# Patient Record
Sex: FEMALE | Race: BLACK OR AFRICAN AMERICAN | NOT HISPANIC OR LATINO | URBAN - METROPOLITAN AREA
[De-identification: names, ages, dates, MRNs, and addresses within clinical notes are randomized per-mention and may not be internally consistent; named-entity substitution may affect disease eponyms.]

---
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
## 2021-05-15 VITALS
HEART RATE: 74 BPM | BODY MASS INDEX: 48.21 KG/M2 | HEIGHT: 62 IN | OXYGEN SATURATION: 100 % | SYSTOLIC BLOOD PRESSURE: 128 MMHG | DIASTOLIC BLOOD PRESSURE: 74 MMHG | TEMPERATURE: 98.2 F | WEIGHT: 262 LBS

## 2023-12-04 ENCOUNTER — INITIAL PRENATAL (OUTPATIENT)
Dept: OBSTETRICS AND GYNECOLOGY | Facility: CLINIC | Age: 23
End: 2023-12-04
Payer: COMMERCIAL

## 2023-12-04 VITALS — WEIGHT: 202 LBS | DIASTOLIC BLOOD PRESSURE: 74 MMHG | SYSTOLIC BLOOD PRESSURE: 115 MMHG | BODY MASS INDEX: 36.95 KG/M2

## 2023-12-04 DIAGNOSIS — O21.9 NAUSEA AND VOMITING DURING PREGNANCY PRIOR TO 22 WEEKS GESTATION: ICD-10-CM

## 2023-12-04 DIAGNOSIS — Z34.90 PREGNANCY WITH UNCERTAIN DATES, ANTEPARTUM: ICD-10-CM

## 2023-12-04 DIAGNOSIS — Z34.90 EARLY STAGE OF PREGNANCY: Primary | ICD-10-CM

## 2023-12-04 PROCEDURE — 0501F PRENATAL FLOW SHEET: CPT | Performed by: OBSTETRICS & GYNECOLOGY

## 2023-12-04 RX ORDER — ONDANSETRON 4 MG/1
4 TABLET, ORALLY DISINTEGRATING ORAL EVERY 8 HOURS PRN
Qty: 20 TABLET | Refills: 0 | Status: SHIPPED | OUTPATIENT
Start: 2023-12-04

## 2023-12-04 RX ORDER — PNV,CALCIUM 72/IRON/FOLIC ACID 27 MG-1 MG
TABLET ORAL
COMMUNITY

## 2023-12-04 NOTE — PROGRESS NOTES
Chief Complaint   Patient presents with    Initial Prenatal Visit     HPI- Pt is 23 y.o.  at 11w2d here for prenatal visit.  Patient presents for initial OB visit.  She states she is sure regarding her LMP.  She denies any major underlying medical problems.  She reports she is currently smoking marijuana, but has cut back and plans to quit with pregnancy.  She does report nausea and vomiting and reports that she occasionally has some bad days, but most days it is very manageable.  She has no other complaints today.    ROS-     - No vaginal bleeding    GI- No abdominal pain    /74   Wt 91.6 kg (202 lb)   LMP 2023   BMI 36.95 kg/m²   Exam - See flow sheet    Fetal heart rate is normal    Assessment-  Diagnoses and all orders for this visit:    Early stage of pregnancy  -     OB Panel With HIV  -     Urine Culture - Urine, Urine, Clean Catch  -     Drug Profile Urine - 9 Drugs - Urine, Clean Catch  -     IGP,CtNgTv,rfx Aptima HPV ASCU    Pregnancy with uncertain dates, antepartum  -     US Ob Transvaginal; Future    Nausea and vomiting during pregnancy prior to 22 weeks gestation  -     ondansetron ODT (ZOFRAN-ODT) 4 MG disintegrating tablet; Place 1 tablet on the tongue Every 8 (Eight) Hours As Needed for Nausea or Vomiting.    Other orders  -     Prenatal Vit-Fe Fumarate-FA (WesTab Plus) 27-1 MG tablet; take 1 tablet by mouth 1 time each day    Initial OB counseling was done.  Discussed delivering hospital and call system.  She states she will be moving back to [facility] and may transfer care to [facility].  I did discuss with her timing of prenatal visits.  I discussed recommendations to stop marijuana in pregnancy and risks associated with marijuana use in pregnancy.  OB labs and ultrasound were ordered and she will follow-up in 4 weeks.

## 2023-12-05 LAB
ABO GROUP BLD: NORMAL
BASOPHILS # BLD AUTO: 0 X10E3/UL (ref 0–0.2)
BASOPHILS NFR BLD AUTO: 1 %
BLD GP AB SCN SERPL QL: NEGATIVE
EOSINOPHIL # BLD AUTO: 0.1 X10E3/UL (ref 0–0.4)
EOSINOPHIL NFR BLD AUTO: 2 %
ERYTHROCYTE [DISTWIDTH] IN BLOOD BY AUTOMATED COUNT: 12.6 % (ref 11.7–15.4)
HBV SURFACE AG SERPL QL IA: NEGATIVE
HCT VFR BLD AUTO: 36 % (ref 34–46.6)
HCV IGG SERPL QL IA: NON REACTIVE
HGB BLD-MCNC: 12.1 G/DL (ref 11.1–15.9)
HIV 1+2 AB+HIV1 P24 AG SERPL QL IA: NON REACTIVE
IMM GRANULOCYTES # BLD AUTO: 0 X10E3/UL (ref 0–0.1)
IMM GRANULOCYTES NFR BLD AUTO: 0 %
LYMPHOCYTES # BLD AUTO: 1.4 X10E3/UL (ref 0.7–3.1)
LYMPHOCYTES NFR BLD AUTO: 24 %
MCH RBC QN AUTO: 28.7 PG (ref 26.6–33)
MCHC RBC AUTO-ENTMCNC: 33.6 G/DL (ref 31.5–35.7)
MCV RBC AUTO: 86 FL (ref 79–97)
MONOCYTES # BLD AUTO: 0.5 X10E3/UL (ref 0.1–0.9)
MONOCYTES NFR BLD AUTO: 8 %
NEUTROPHILS # BLD AUTO: 3.9 X10E3/UL (ref 1.4–7)
NEUTROPHILS NFR BLD AUTO: 65 %
PLATELET # BLD AUTO: 274 X10E3/UL (ref 150–450)
RBC # BLD AUTO: 4.21 X10E6/UL (ref 3.77–5.28)
RH BLD: POSITIVE
RPR SER QL: NON REACTIVE
RUBV IGG SERPL IA-ACNC: 3.72 INDEX
WBC # BLD AUTO: 5.9 X10E3/UL (ref 3.4–10.8)

## 2023-12-06 LAB
BACTERIA UR CULT: NORMAL
BACTERIA UR CULT: NORMAL

## 2023-12-07 ENCOUNTER — TELEPHONE (OUTPATIENT)
Dept: OBSTETRICS AND GYNECOLOGY | Facility: CLINIC | Age: 23
End: 2023-12-07
Payer: COMMERCIAL

## 2023-12-07 PROBLEM — O98.819 CHLAMYDIA INFECTION DURING PREGNANCY: Status: ACTIVE | Noted: 2023-12-07

## 2023-12-07 PROBLEM — A59.01 TRICHOMONAL VAGINITIS: Status: ACTIVE | Noted: 2023-12-07

## 2023-12-07 PROBLEM — A74.9 CHLAMYDIA INFECTION DURING PREGNANCY: Status: ACTIVE | Noted: 2023-12-07

## 2023-12-07 LAB
C TRACH RRNA CVX QL NAA+PROBE: POSITIVE
CONV .: ABNORMAL
CYTOLOGIST CVX/VAG CYTO: ABNORMAL
CYTOLOGY CVX/VAG DOC CYTO: ABNORMAL
CYTOLOGY CVX/VAG DOC THIN PREP: ABNORMAL
DX ICD CODE: ABNORMAL
HIV 1 & 2 AB SER-IMP: ABNORMAL
N GONORRHOEA RRNA CVX QL NAA+PROBE: NEGATIVE
OTHER STN SPEC: ABNORMAL
STAT OF ADQ CVX/VAG CYTO-IMP: ABNORMAL
T VAGINALIS RRNA SPEC QL NAA+PROBE: POSITIVE

## 2023-12-07 RX ORDER — METRONIDAZOLE 500 MG/1
500 TABLET ORAL 2 TIMES DAILY
Qty: 14 TABLET | Refills: 0 | Status: SHIPPED | OUTPATIENT
Start: 2023-12-07 | End: 2023-12-14

## 2023-12-07 RX ORDER — AZITHROMYCIN 500 MG/1
1000 TABLET, FILM COATED ORAL ONCE
Qty: 2 TABLET | Refills: 0 | Status: SHIPPED | OUTPATIENT
Start: 2023-12-07 | End: 2023-12-07

## 2023-12-07 NOTE — TELEPHONE ENCOUNTER
----- Message from Carline Smiley MD sent at 12/7/2023  1:13 PM EST -----  Please let patient know she is positive for chlamydia and trichomonas.  Both are STDs that her partner needs to be treated for and they should refrain from intercourse for at least one week after each have completed treatment.  I have sent two antibiotics to her pharmacy for treatment.

## 2023-12-09 LAB
AMPHETAMINES UR QL SCN: NEGATIVE NG/ML
BARBITURATES UR QL SCN: NEGATIVE NG/ML
BENZODIAZ UR QL: NEGATIVE NG/ML
BZE UR QL: NEGATIVE NG/ML
CARBOXYTHC UR QL CFM: POSITIVE
METHADONE UR QL SCN: NEGATIVE NG/ML
OPIATES UR QL: NEGATIVE NG/ML
PCP UR QL SCN: NEGATIVE NG/ML
PROPOXYPH UR QL SCN: NEGATIVE NG/ML

## 2024-01-08 ENCOUNTER — ROUTINE PRENATAL (OUTPATIENT)
Dept: OBSTETRICS AND GYNECOLOGY | Facility: CLINIC | Age: 24
End: 2024-01-08
Payer: MEDICAID

## 2024-01-08 VITALS — BODY MASS INDEX: 36.95 KG/M2 | SYSTOLIC BLOOD PRESSURE: 121 MMHG | WEIGHT: 202 LBS | DIASTOLIC BLOOD PRESSURE: 71 MMHG

## 2024-01-08 DIAGNOSIS — A74.9 CHLAMYDIA INFECTION DURING PREGNANCY: ICD-10-CM

## 2024-01-08 DIAGNOSIS — Z36.0 ENCOUNTER FOR ANTENATAL SCREENING FOR CHROMOSOMAL ANOMALIES: ICD-10-CM

## 2024-01-08 DIAGNOSIS — Z36.89 ENCOUNTER FOR FETAL ANATOMIC SURVEY: ICD-10-CM

## 2024-01-08 DIAGNOSIS — O98.819 CHLAMYDIA INFECTION DURING PREGNANCY: ICD-10-CM

## 2024-01-08 DIAGNOSIS — Z36.1 ENCOUNTER FOR ANTENATAL SCREENING FOR HIGH ALPHA-FETOPROTEIN LEVEL: ICD-10-CM

## 2024-01-08 DIAGNOSIS — Z3A.16 16 WEEKS GESTATION OF PREGNANCY: Primary | ICD-10-CM

## 2024-01-08 DIAGNOSIS — A59.01 TRICHOMONAL VAGINITIS: ICD-10-CM

## 2024-01-08 DIAGNOSIS — Z36.86 ENCOUNTER FOR ANTENATAL SCREENING FOR CERVICAL LENGTH: ICD-10-CM

## 2024-01-08 PROCEDURE — 99214 OFFICE O/P EST MOD 30 MIN: CPT | Performed by: OBSTETRICS & GYNECOLOGY

## 2024-01-08 NOTE — PROGRESS NOTES
Chief Complaint   Patient presents with    Routine Prenatal Visit     HPI- Pt is 23 y.o.  at 16w2d here for prenatal visit.  Patient has no complaints today and has been feeling well.  She did complete antibiotics for STDs and denies intercourse since then.    ROS-     - No vaginal bleeding    GI- No abdominal pain    /71   Wt 91.6 kg (202 lb)   LMP 2023   BMI 36.95 kg/m²   Exam - See flow sheet    Fetal heart rate is normal    Assessment-  Diagnoses and all orders for this visit:    16 weeks gestation of pregnancy    Encounter for  screening for high alpha-fetoprotein level  -     Alpha Fetoprotein, Maternal    Encounter for  screening for chromosomal anomalies  -     [id number] PLUS Core (chr21,18,13,sex) - Blood,    Chlamydia infection during pregnancy  -     Chlamydia trachomatis, Neisseria gonorrhoeae, Trichomonas vaginalis, PCR - Swab, Vagina    Trichomonal vaginitis  -     Chlamydia trachomatis, Neisseria gonorrhoeae, Trichomonas vaginalis, PCR - Swab, Vagina    Encounter for fetal anatomic survey  -     US Ob 14 + Weeks Single or First Gestation; Future    Encounter for  screening for cervical length  -     US Ob Transvaginal; Future    Test of cure was obtained today.  She was offered cell free DNA testing and screening for spina bifida and desires.  Discussed anatomy ultrasound with her next visit in 4 weeks.

## 2024-01-10 LAB
AFP INTERP SERPL-IMP: NORMAL
AFP INTERP SERPL-IMP: NORMAL
AFP MOM SERPL: 1.47
AFP SERPL-MCNC: 47.2 NG/ML
AGE AT DELIVERY: 23.6 YR
C TRACH RRNA SPEC QL NAA+PROBE: NEGATIVE
GA METHOD: NORMAL
GA: 16.3 WEEKS
IDDM PATIENT QL: NO
LABORATORY COMMENT REPORT: NORMAL
MULTIPLE PREGNANCY: NO
N GONORRHOEA RRNA SPEC QL NAA+PROBE: NEGATIVE
NEURAL TUBE DEFECT RISK FETUS: 5931 %
RESULT: NORMAL
T VAGINALIS RRNA SPEC QL NAA+PROBE: NEGATIVE

## 2024-01-15 LAB
CFDNA.FET/CFDNA.TOTAL SFR FETUS: NORMAL %
CITATION REF LAB TEST: NORMAL
FET 13+18+21+X+Y ANEUP PLAS.CFDNA: NEGATIVE
FET CHR 21 TS PLAS.CFDNA QL: NEGATIVE
FET SEX PLAS.CFDNA DOSAGE CFDNA: NORMAL
FET TS 13 RISK PLAS.CFDNA QL: NEGATIVE
FET TS 18 RISK WBC.DNA+CFDNA QL: NEGATIVE
GA EST FROM CONCEPTION DATE: NORMAL D
GESTATIONAL AGE > 9:: YES
LAB DIRECTOR NAME PROVIDER: NORMAL
LAB DIRECTOR NAME PROVIDER: NORMAL
LABORATORY COMMENT REPORT: NORMAL
LIMITATIONS OF THE TEST: NORMAL
NEGATIVE PREDICTIVE VALUE: NORMAL
NOTE: NORMAL
PERFORMANCE CHARACTERISTICS: NORMAL
POSITIVE PREDICTIVE VALUE: NORMAL
REF LAB TEST METHOD: NORMAL
TEST PERFORMANCE INFO SPEC: NORMAL

## 2024-01-16 ENCOUNTER — TELEPHONE (OUTPATIENT)
Dept: OBSTETRICS AND GYNECOLOGY | Facility: CLINIC | Age: 24
End: 2024-01-16
Payer: MEDICAID

## 2024-01-16 NOTE — TELEPHONE ENCOUNTER
----- Message from Carline Smiley MD sent at 1/16/2024  8:15 AM EST -----  Please let patient know that her genetic testing was normal, and if she wants to know gender, it showed a female fetus

## 2024-01-30 ENCOUNTER — INITIAL PRENATAL (OUTPATIENT)
Dept: OBSTETRICS AND GYNECOLOGY | Facility: CLINIC | Age: 24
End: 2024-01-30
Payer: MEDICAID

## 2024-01-30 ENCOUNTER — PATIENT ROUNDING (BHMG ONLY) (OUTPATIENT)
Dept: OBSTETRICS AND GYNECOLOGY | Facility: CLINIC | Age: 24
End: 2024-01-30
Payer: MEDICAID

## 2024-01-30 VITALS — WEIGHT: 201 LBS | BODY MASS INDEX: 36.76 KG/M2 | DIASTOLIC BLOOD PRESSURE: 74 MMHG | SYSTOLIC BLOOD PRESSURE: 113 MMHG

## 2024-01-30 DIAGNOSIS — Z34.00 SUPERVISION OF NORMAL FIRST PREGNANCY, ANTEPARTUM: Primary | ICD-10-CM

## 2024-01-30 LAB
GLUCOSE UR STRIP-MCNC: NEGATIVE MG/DL
PROT UR STRIP-MCNC: NEGATIVE MG/DL

## 2024-01-30 NOTE — PROGRESS NOTES
OB FOLLOW UP      Chief Complaint   Patient presents with    Routine Prenatal Visit     Est care with office New OB transfer      Subjective:   No complaints  Patient has moved from [place] and will be continuing care with our practice.    Objective:  /74   Wt 91.2 kg (201 lb)   LMP 09/16/2023   BMI 36.76 kg/m²  -0.454 kg (-1 lb)  Uterine Size: size equals dates, below umbilicus  FHT: 110-160 BPM    See OB flow for edema, cvx exam if performed, and Upro/Uglu    Assessment and Plan:  19w3d  Reassuring pregnancy progress.  Questions answered.  Diagnoses and all orders for this visit:    1. Supervision of normal first pregnancy, antepartum (Primary)  Overview:  GARETT finalized: 6/22/24 per LMP, 11-week US and ACOG    Optional testing NIPS,CF/SMA,AFP:  NIPS neg, AFP neg    COVID:   Flu:  Tdap:  RSV:    Rhogam:  28-32 weeks repeat TPA:  ? Desires Sterilization:    Anatomy US: Ordered 1/30/24  FU US:    PROBLEM LIST/PLAN:   Chlamydia/trich - positive on 12/4/23, negative on 1/8/24        Assessment & Plan:  Doing well, no complaints  Initial care in [place], has moved back to our area  Anatomy scan ordered  Second trimester precautions  1hGTT next visit    Orders:  -     POC Urinalysis Dipstick  -     US Ob Detail Fetal Anatomy Single or First Gestation; Future      Counseling:    Second trimester precautions  Continue PNV.  Importance of healthy eating and staying active.    Return in about 5 weeks (around 3/5/2024) for [facility], OB follow up, 1hGTT.        Gerard Spencer, APRN  01/30/2024    [facility] [address] [place]  [facility]  [address] [place] [address]  Dept: [phone]  Dept Fax: [phone]  Loc: [phone]

## 2024-01-30 NOTE — PROGRESS NOTES
A My-Chart message has been sent to the patient for PATIENT ROUNDING with [facility].

## 2024-01-30 NOTE — ASSESSMENT & PLAN NOTE
Doing well, no complaints  Initial care in [place], has moved back to our area  Anatomy scan ordered  Second trimester precautions  1hGTT next visit

## 2024-02-06 ENCOUNTER — HOSPITAL ENCOUNTER (OUTPATIENT)
Dept: ULTRASOUND IMAGING | Facility: HOSPITAL | Age: 24
Discharge: HOME | End: 2024-02-06
Admitting: NURSE PRACTITIONER
Payer: MEDICAID

## 2024-02-06 DIAGNOSIS — Z34.00 SUPERVISION OF NORMAL FIRST PREGNANCY, ANTEPARTUM: ICD-10-CM

## 2024-02-06 PROCEDURE — 76811 OB US DETAILED SNGL FETUS: CPT

## 2024-02-07 ENCOUNTER — TELEPHONE (OUTPATIENT)
Dept: OBSTETRICS AND GYNECOLOGY | Facility: CLINIC | Age: 24
End: 2024-02-07
Payer: MEDICAID

## 2024-02-07 NOTE — TELEPHONE ENCOUNTER
Called patient left message to contact office back to go over ultrasound results.       US Ob Detail Fetal Anatomy Single or First Gestation  Order: [id number]  Status: Final result       Visible to patient: No (scheduled for 2/7/2024 12:46 PM)       Dx: Supervision of normal first pregnancy...    0 Result Notes  Details    Reading Physician Reading Date Result Priority   Noel Leiva MD  [phone] 2/7/2024      Narrative & Impression  PROCEDURE:  US OB DETAIL FETAL ANATOMY SINGLE OR FIRST GESTATION     COMPARISON:  None  INDICATIONS:  Evaluation of fetal anatomy     TECHNIQUE:    A sonographic examination was performed for obstetrical and fetal evaluation.       FINDINGS:          FETAL NUMBER:           Single.    POSITION:        Cephalic.  AMNIOTIC FLUID VOLUME:      Normal for age with GENESIS of 14.27 cm.  PLACENTA LOCATION:             Anteriorly with no placenta previa.  BIPARIETAL DIAMETER:           4.82 cm; 20 weeks and 4 days (+/-12 days); 55.9 percent  HEAD CIRCUMFERENCE:         17.54 cm; 20 weeks (+/-10 days); 24.9 percent  ABD CIRCUMFERENCE:           15.76 cm; 20 weeks and 6 days (+/-14 days); 59.9 percent  FEMUR LENGTH:          3.25 cm; 20 weeks and 1 day (+/-13 days); 31.5 percent  ESTIMATED FETAL WEIGHT:   13 oz (+/-2 oz)   Percentile:  49.5 percent  HEART RATE:   144 bpm  FACE AND LIPS:           Normal.  FETAL ANATOMY:        The following structures are normal for fetal age unless specified below:    Ventricles, posterior fossa, spine, heart, LVOT, RVOT, thorax, abdomen, cord, stomach, kidneys, and   bladder.  ABNORMALITIES/OTHER:         None.  CLINICAL GA:   20 weeks and 3 days  CLINICAL GARETT: 6/22/2024  ULTRASOUND GA:       20 weeks and 2 days (+/-10 days)  ULTRASOUND GARETT:     6/23/2024     CONTINUED ON NEXT PAGE...     IMPRESSION:                 1. Single viable intrauterine pregnancy with the fetus is cephalic presentation.  2. Estimated gestational age of 20 weeks and 2 days (+/-10  days).  The ultrasound derived due date   would occur on 6/23/2024.  3. Normal fetal anatomy.            LALITA MONTEIRO MD         Electronically Signed and Approved By: LALITA MONTEIRO MD on 2/07/2024 at 11:43

## 2024-02-07 NOTE — TELEPHONE ENCOUNTER
Called patient went over ultrasound results. Patient voiced no questions at this time.     US Ob Detail Fetal Anatomy Single or First Gestation  Order: [id number]  Status: Final result       Visible to patient: No (scheduled for 2/7/2024 12:46 PM)       Dx: Supervision of normal first pregnancy...    0 Result Notes       1 Follow-up Encounter  Details    Reading Physician Reading Date Result Priority   Noel Leiva MD  [phone] 2/7/2024      Narrative & Impression  PROCEDURE:  US OB DETAIL FETAL ANATOMY SINGLE OR FIRST GESTATION     COMPARISON:  None  INDICATIONS:  Evaluation of fetal anatomy     TECHNIQUE:    A sonographic examination was performed for obstetrical and fetal evaluation.       FINDINGS:          FETAL NUMBER:           Single.    POSITION:        Cephalic.  AMNIOTIC FLUID VOLUME:      Normal for age with GENESIS of 14.27 cm.  PLACENTA LOCATION:             Anteriorly with no placenta previa.  BIPARIETAL DIAMETER:           4.82 cm; 20 weeks and 4 days (+/-12 days); 55.9 percent  HEAD CIRCUMFERENCE:         17.54 cm; 20 weeks (+/-10 days); 24.9 percent  ABD CIRCUMFERENCE:           15.76 cm; 20 weeks and 6 days (+/-14 days); 59.9 percent  FEMUR LENGTH:          3.25 cm; 20 weeks and 1 day (+/-13 days); 31.5 percent  ESTIMATED FETAL WEIGHT:   13 oz (+/-2 oz)   Percentile:  49.5 percent  HEART RATE:   144 bpm  FACE AND LIPS:           Normal.  FETAL ANATOMY:        The following structures are normal for fetal age unless specified below:    Ventricles, posterior fossa, spine, heart, LVOT, RVOT, thorax, abdomen, cord, stomach, kidneys, and   bladder.  ABNORMALITIES/OTHER:         None.  CLINICAL GA:   20 weeks and 3 days  CLINICAL GARETT: 6/22/2024  ULTRASOUND GA:       20 weeks and 2 days (+/-10 days)  ULTRASOUND GARETT:     6/23/2024     CONTINUED ON NEXT PAGE...     IMPRESSION:                 1. Single viable intrauterine pregnancy with the fetus is cephalic presentation.  2. Estimated gestational age of  20 weeks and 2 days (+/-10 days).  The ultrasound derived due date   would occur on 6/23/2024.  3. Normal fetal anatomy.            LALITA MONTEIRO MD         Electronically Signed and Approved By: LALITA MONTEIRO MD on 2/07/2024 at 11:43

## 2024-02-22 ENCOUNTER — REFERRAL TRIAGE (OUTPATIENT)
Dept: LABOR AND DELIVERY | Facility: HOSPITAL | Age: 24
End: 2024-02-22
Payer: MEDICAID

## 2024-03-04 NOTE — PROGRESS NOTES
Routine Prenatal Visit     Subjective  Macey León is a 23 y.o.  at 24w3d here for her routine OB visit.   She is taking her prenatal vitamins.Reports no loss of fluid or vaginal bleeding. Patient doing well without any complaints. Pregnancy is complicated by:     Patient Active Problem List   Diagnosis    Chlamydia infection during pregnancy    Trichomonal vaginitis    Supervision of normal first pregnancy, antepartum         OB History    Para Term  AB Living   1             SAB IAB Ectopic Molar Multiple Live Births                    # Outcome Date GA Lbr Logan/2nd Weight Sex Type Anes PTL Lv   1 Current                    ROS:   General ROS: negative for - chills or fatigue  Genito-Urinary ROS: negative for  change in urinary stream, vaginal discharge     Objective  Physical Exam:   Vitals:    24 0840   BP: 115/78       Uterine Size: size equals dates  FHT: 110-160 BPM    General appearance - alert, well appearing, and in no distress  Abdomen- Soft, Gravid uterus, non-tender to palpation  Extremeties: negative swelling, varicose veins noted in R lower extremity      Assessment/Plan:   Diagnoses and all orders for this visit:    1. Supervision of normal first pregnancy, antepartum (Primary)  Assessment & Plan:  GARETT finalized: 24 per LMP, 11-week US and ACOG     Optional testing NIPS,CF/SMA,AFP:  NIPS neg, AFP neg     COVID: recommended  Flu: recommended  Tdap: 27-36 weeks  RSV:     Rhogam:  28-32 weeks repeat TPA:  ? Desires Sterilization:     Anatomy US: 24 normal anatomy, anterior placenta, EFW 49.5%, AC 59.9%  FU US:     PROBLEM LIST/PLAN:   Chlamydia/trich - positive on 23, negative on 24        Orders:  -     POC Urinalysis Dipstick  -     Gestational Diabetes Screen 1 Hour; Future  -     CBC (No Diff); Future  -     Gestational Diabetes Screen 1 Hour  -     CBC (No Diff)    2. Encounter for screening examination for impaired glucose regulation and diabetes  mellitus  -     Gestational Diabetes Screen 1 Hour; Future  -     Gestational Diabetes Screen 1 Hour    3. Varicose veins of right lower extremity, unspecified whether complicated    4. Does not have primary care provider  -     Ambulatory Referral to Family Practice    Advised compression socks to help with veins      Counseling:   OB precautions, leaking, VB, blossom hankins vs PTL/Labor  FKC  EDEMA of lower extremities in pregnancy reviewed.  We discussed conservative options to include dietary changes, elevation, and compression stockings.  DVT signs and symptoms reviewed.  To ER immediately if any signs or symptoms of DVT.  Round Ligament Pain:  The uterus has several ligaments which provide support and keep the uterus in place. As the  uterus grows these ligaments are pulled and stretched which often causes sharp stabbing like pain in the inguinal area.   You may find a pregnancy support band helpful. Changing positions may also help. Yoga is a great way to cope with round ligament and low back pain in pregnancy.    Massage may also help with low back pain   Things to Consider at this Point in your Pregnancy:  Some women experience swelling in their feet during pregnancy. Compression stockings may help  Drink plenty of water and stay active   Make sure you are eating frequent small meals, nuts are a wonderful snack to keep with you            Return in about 4 weeks (around 4/2/2024) for Routine OB visit.      We have gone over prenatal care to include the timing and content of visits. I informed her how to contact the office and/or on call person in the event of any problems and encouraged her to do so when she feels it is necessary.  We then spent time answering her questions which she indicated were answered to her satisfaction.    Devi Wylie,   3/5/2024 09:48 EST

## 2024-03-05 ENCOUNTER — ROUTINE PRENATAL (OUTPATIENT)
Dept: OBSTETRICS AND GYNECOLOGY | Facility: CLINIC | Age: 24
End: 2024-03-05
Payer: MEDICAID

## 2024-03-05 VITALS — SYSTOLIC BLOOD PRESSURE: 115 MMHG | DIASTOLIC BLOOD PRESSURE: 78 MMHG | WEIGHT: 210 LBS | BODY MASS INDEX: 38.41 KG/M2

## 2024-03-05 DIAGNOSIS — Z34.00 SUPERVISION OF NORMAL FIRST PREGNANCY, ANTEPARTUM: Primary | ICD-10-CM

## 2024-03-05 DIAGNOSIS — Z13.1 ENCOUNTER FOR SCREENING EXAMINATION FOR IMPAIRED GLUCOSE REGULATION AND DIABETES MELLITUS: ICD-10-CM

## 2024-03-05 DIAGNOSIS — Z75.8 DOES NOT HAVE PRIMARY CARE PROVIDER: ICD-10-CM

## 2024-03-05 DIAGNOSIS — I83.91 VARICOSE VEINS OF RIGHT LOWER EXTREMITY, UNSPECIFIED WHETHER COMPLICATED: ICD-10-CM

## 2024-03-05 LAB
DEPRECATED RDW RBC AUTO: 37.8 FL (ref 37–54)
ERYTHROCYTE [DISTWIDTH] IN BLOOD BY AUTOMATED COUNT: 12.2 % (ref 12.3–15.4)
GLUCOSE 1H P GLC SERPL-MCNC: 135 MG/DL (ref 65–139)
GLUCOSE UR STRIP-MCNC: NEGATIVE MG/DL
HCT VFR BLD AUTO: 34.5 % (ref 34–46.6)
HGB BLD-MCNC: 10.9 G/DL (ref 12–15.9)
MCH RBC QN AUTO: 27.1 PG (ref 26.6–33)
MCHC RBC AUTO-ENTMCNC: 31.6 G/DL (ref 31.5–35.7)
MCV RBC AUTO: 85.8 FL (ref 79–97)
PLATELET # BLD AUTO: 335 10*3/MM3 (ref 140–450)
PMV BLD AUTO: 9.1 FL (ref 6–12)
PROT UR STRIP-MCNC: NEGATIVE MG/DL
RBC # BLD AUTO: 4.02 10*6/MM3 (ref 3.77–5.28)
WBC NRBC COR # BLD AUTO: 10.77 10*3/MM3 (ref 3.4–10.8)

## 2024-03-05 PROCEDURE — 82950 GLUCOSE TEST: CPT | Performed by: STUDENT IN AN ORGANIZED HEALTH CARE EDUCATION/TRAINING PROGRAM

## 2024-03-05 PROCEDURE — 85027 COMPLETE CBC AUTOMATED: CPT | Performed by: STUDENT IN AN ORGANIZED HEALTH CARE EDUCATION/TRAINING PROGRAM

## 2024-03-05 RX ORDER — FERROUS SULFATE 325(65) MG
325 TABLET ORAL EVERY OTHER DAY
Qty: 30 TABLET | Refills: 1 | Status: SHIPPED | OUTPATIENT
Start: 2024-03-05

## 2024-03-05 NOTE — ASSESSMENT & PLAN NOTE
GARETT finalized: 6/22/24 per LMP, 11-week US and ACOG     Optional testing NIPS,CF/SMA,AFP:  NIPS neg, AFP neg     COVID: recommended  Flu: recommended  Tdap: 27-36 weeks  RSV:     Rhogam:  28-32 weeks repeat TPA:  ? Desires Sterilization:     Anatomy US: 2/6/24 normal anatomy, anterior placenta, EFW 49.5%, AC 59.9%  FU US:     PROBLEM LIST/PLAN:   Chlamydia/trich - positive on 12/4/23, negative on 1/8/24

## 2024-03-07 ENCOUNTER — TELEPHONE (OUTPATIENT)
Dept: OBSTETRICS AND GYNECOLOGY | Facility: CLINIC | Age: 24
End: 2024-03-07
Payer: MEDICAID

## 2024-03-07 DIAGNOSIS — E74.39 GLUCOSE INTOLERANCE: Primary | ICD-10-CM

## 2024-03-07 NOTE — TELEPHONE ENCOUNTER
----- Message from Devi Wylie DO sent at 3/5/2024  1:25 PM EST -----  Failed 1hr gtt, needs 3hr gtt

## 2024-03-07 NOTE — TELEPHONE ENCOUNTER
Discussed results with pt. She is scheduled for her three hr gtt at [facility] on 03/15. Please sign attached order.

## 2024-03-15 ENCOUNTER — LAB (OUTPATIENT)
Dept: LAB | Facility: HOSPITAL | Age: 24
End: 2024-03-15
Payer: MEDICAID

## 2024-03-15 DIAGNOSIS — E74.39 GLUCOSE INTOLERANCE: ICD-10-CM

## 2024-03-15 LAB
GLUCOSE BLDC GLUCOMTR-MCNC: 77 MG/DL (ref 70–99)
GLUCOSE P FAST SERPL-MCNC: 75 MG/DL (ref 65–94)
GTT GEST 2H PNL UR+SERPL: 109 MG/DL (ref 65–179)
GTT GEST 3H PNL SERPL: 69 MG/DL (ref 65–139)
GTT GEST 3H PNL SERPL: 82 MG/DL (ref 65–154)

## 2024-03-15 PROCEDURE — 82952 GTT-ADDED SAMPLES: CPT

## 2024-03-15 PROCEDURE — 36415 COLL VENOUS BLD VENIPUNCTURE: CPT

## 2024-03-15 PROCEDURE — 82951 GLUCOSE TOLERANCE TEST (GTT): CPT

## 2024-03-20 ENCOUNTER — OFFICE VISIT (OUTPATIENT)
Dept: INTERNAL MEDICINE | Age: 24
End: 2024-03-20
Payer: MEDICAID

## 2024-03-20 VITALS
TEMPERATURE: 97.7 F | RESPIRATION RATE: 16 BRPM | HEIGHT: 62 IN | SYSTOLIC BLOOD PRESSURE: 110 MMHG | DIASTOLIC BLOOD PRESSURE: 65 MMHG | HEART RATE: 77 BPM | BODY MASS INDEX: 39.45 KG/M2 | OXYGEN SATURATION: 99 % | WEIGHT: 214.4 LBS

## 2024-03-20 DIAGNOSIS — Z00.00 ANNUAL PHYSICAL EXAM: ICD-10-CM

## 2024-03-20 DIAGNOSIS — Z76.89 ENCOUNTER TO ESTABLISH CARE: Primary | ICD-10-CM

## 2024-03-20 NOTE — PROGRESS NOTES
"Chief Complaint  Establish Care (Patient is pregnant. OB wanted her to have a PCP. )  Subjective    History of Present Illness  Macey León is a 23 y.o. female who presents to [facility]:    To establish care.     Due for Her annual physical exam.       Current Outpatient Medications:     ondansetron ODT (ZOFRAN-ODT) 4 MG disintegrating tablet, Place 1 tablet on the tongue Every 8 (Eight) Hours As Needed for Nausea or Vomiting., Disp: 20 tablet, Rfl: 0    Prenatal Vit-Fe Fumarate-FA (WesTab Plus) 27-1 MG tablet, take 1 tablet by mouth 1 time each day, Disp: , Rfl:   No Known Allergies   Past Medical History:   Diagnosis Date    Chlamydia     Trichomonal vaginitis 12/7/2023      History reviewed. No pertinent surgical history.     Objective   /65 (BP Location: Right arm, Patient Position: Sitting, Cuff Size: Large Adult)   Pulse 77   Temp 97.7 °F (36.5 °C) (Temporal)   Resp 16   Ht 157.5 cm (62\")   Wt 97.3 kg (214 lb 6.4 oz)   LMP 09/16/2023   SpO2 99%   BMI 39.21 kg/m²    Estimated body mass index is 39.21 kg/m² as calculated from the following:    Height as of this encounter: 157.5 cm (62\").    Weight as of this encounter: 97.3 kg (214 lb 6.4 oz).     Physical Exam  Vitals reviewed.   Constitutional:       General: She is not in acute distress.  HENT:      Head: Normocephalic and atraumatic.      Right Ear: Tympanic membrane and ear canal normal.      Left Ear: Tympanic membrane and ear canal normal.   Eyes:      Conjunctiva/sclera: Conjunctivae normal.   Cardiovascular:      Rate and Rhythm: Normal rate and regular rhythm.      Heart sounds: Normal heart sounds. No murmur heard.  Pulmonary:      Effort: Pulmonary effort is normal.      Breath sounds: Normal breath sounds. No wheezing, rhonchi or rales.   Musculoskeletal:      Right lower leg: No edema.      Left lower leg: No edema.   Lymphadenopathy:      Cervical: No cervical adenopathy.   Skin:     " General: Skin is warm and dry.      Coloration: Skin is not jaundiced or pale.   Neurological:      General: No focal deficit present.      Mental Status: She is alert.   Psychiatric:         Attention and Perception: Attention normal.         Mood and Affect: Mood normal.         Speech: Speech normal.         Behavior: Behavior normal.         Thought Content: Thought content normal. Thought content does not include suicidal ideation. Thought content does not include suicidal plan.         Cognition and Memory: Cognition normal.          Result Review :  The following data was reviewed by: MAXIM House on 03/20/2024:  Common labs          12/4/2023    10:20 3/5/2024    09:32   Common Labs   WBC 5.9  10.77    Hemoglobin 12.1  10.9    Hematocrit 36.0  34.5    Platelets 274  335                  Assessment and Plan   Diagnoses and all orders for this visit:    1. Encounter to establish care (Primary)    2. Annual physical exam      Discussed healthy diet, exercise, adequate sleep, cancer screening, immunizations and preventative care. Annual eye exam and twice yearly dental cleaning.    Class 2 Severe Obesity (BMI >=35 and <=39.9). Obesity-related health conditions include the following: none. Obesity is  due to pregnancy . BMI is  pregnancy . We discussed  pregnancy .       Patient was given instructions and counseling regarding her condition or for health maintenance advice. Please see specific information pulled into the AVS if appropriate.     Follow Up   Return in about 1 year (around 3/20/2025) for Annual physical.    Dictated Utilizing Dragon Dictation.  Please note that portions of this note were completed with a voice recognition program.  Part of this note may be an electronic transcription/translation of spoken language to printed text using the Dragon Dictation System.    MAXIM House

## 2024-03-27 NOTE — PROGRESS NOTES
Routine Prenatal Visit     Subjective  Macey León is a 23 y.o.  at 28w3d here for her routine OB visit.   She is taking her prenatal vitamins.Reports no loss of fluid or vaginal bleeding. Patient doing well without any complaints. Pregnancy is complicated by:     Patient Active Problem List   Diagnosis    Chlamydia infection during pregnancy    Trichomonal vaginitis    Supervision of normal first pregnancy, antepartum         OB History    Para Term  AB Living   1             SAB IAB Ectopic Molar Multiple Live Births                    # Outcome Date GA Lbr Logan/2nd Weight Sex Type Anes PTL Lv   1 Current                    ROS:   General ROS: negative for - chills or fatigue  Genito-Urinary ROS: negative for  change in urinary stream, vaginal discharge     Objective  Physical Exam:   Vitals:    24 0935   BP: 121/84       Uterine Size: size equals dates  FHT: 110-160 BPM    General appearance - alert, well appearing, and in no distress  Abdomen- Soft, Gravid uterus, non-tender to palpation  Extremeties: negative swelling       Assessment/Plan:   Diagnoses and all orders for this visit:    1. Supervision of normal first pregnancy, antepartum (Primary)  Assessment & Plan:  GARETT finalized: 24 per LMP, 11-week US and ACOG     Optional testing NIPS,CF/SMA,AFP:  NIPS neg, AFP neg     COVID: recommended  Flu: recommended  Tdap: 27-36 weeks  RSV:     Rhogam:  28-32 weeks repeat TPA:  ? Desires Sterilization:     Anatomy US: 24 normal anatomy, anterior placenta, EFW 49.5%, AC 59.9%  FU US:     PROBLEM LIST/PLAN:   Chlamydia/trich - positive on 23, negative on 24        Orders:  -     POC Urinalysis Dipstick          Counseling:   OB precautions, leaking, VB, blossom hankins vs PTL/Labor  FKC  EDEMA of lower extremities in pregnancy reviewed.  We discussed conservative options to include dietary changes, elevation, and compression stockings.  DVT signs and symptoms reviewed.   To ER immediately if any signs or symptoms of DVT.  Round Ligament Pain:  The uterus has several ligaments which provide support and keep the uterus in place. As the  uterus grows these ligaments are pulled and stretched which often causes sharp stabbing like pain in the inguinal area.   You may find a pregnancy support band helpful. Changing positions may also help. Yoga is a great way to cope with round ligament and low back pain in pregnancy.    Massage may also help with low back pain   Things to Consider at this Point in your Pregnancy:  Some women experience swelling in their feet during pregnancy. Compression stockings may help  Drink plenty of water and stay active   Make sure you are eating frequent small meals, nuts are a wonderful snack to keep with you            Return in about 4 weeks (around 4/30/2024) for Routine OB visit.      We have gone over prenatal care to include the timing and content of visits. I informed her how to contact the office and/or on call person in the event of any problems and encouraged her to do so when she feels it is necessary.  We then spent time answering her questions which she indicated were answered to her satisfaction.    Devi Wylie,   4/2/2024 09:52 EDT

## 2024-04-02 ENCOUNTER — ROUTINE PRENATAL (OUTPATIENT)
Dept: OBSTETRICS AND GYNECOLOGY | Facility: CLINIC | Age: 24
End: 2024-04-02
Payer: MEDICAID

## 2024-04-02 VITALS — DIASTOLIC BLOOD PRESSURE: 84 MMHG | WEIGHT: 225 LBS | SYSTOLIC BLOOD PRESSURE: 121 MMHG | BODY MASS INDEX: 41.15 KG/M2

## 2024-04-02 DIAGNOSIS — Z34.00 SUPERVISION OF NORMAL FIRST PREGNANCY, ANTEPARTUM: Primary | ICD-10-CM

## 2024-04-02 LAB
GLUCOSE UR STRIP-MCNC: NEGATIVE MG/DL
PROT UR STRIP-MCNC: NEGATIVE MG/DL

## 2024-04-03 ENCOUNTER — PATIENT ROUNDING (BHMG ONLY) (OUTPATIENT)
Dept: INTERNAL MEDICINE | Age: 24
End: 2024-04-03
Payer: MEDICAID

## 2024-04-26 NOTE — PROGRESS NOTES
Routine Prenatal Visit     Subjective  Macey León is a 23 y.o.  at 32w3d here for her routine OB visit.   She is taking her prenatal vitamins.Reports no loss of fluid or vaginal bleeding. Patient doing well without any complaints. Pregnancy complicated by:     Patient Active Problem List   Diagnosis    Chlamydia infection during pregnancy    Trichomonal vaginitis    Supervision of normal first pregnancy, antepartum         OB History    Para Term  AB Living   1             SAB IAB Ectopic Molar Multiple Live Births                    # Outcome Date GA Lbr Logan/2nd Weight Sex Type Anes PTL Lv   1 Current                    ROS:   General ROS: negative for - chills or fatigue  Genito-Urinary ROS: negative for  change in urinary stream, vaginal discharge     Objective  Physical Exam:   Vitals:    24 1444   BP: 128/84       Uterine Size: size equals dates  FHT: 110-160 BPM    General appearance - alert, well appearing, and in no distress  Abdomen- Soft, Gravid uterus, non-tender to palpation  Extremeties: negative swelling       Assessment/Plan:   Diagnoses and all orders for this visit:    1. Supervision of normal first pregnancy, antepartum (Primary)  Assessment & Plan:  GARETT finalized: 24 per LMP, 11-week US and ACOG     Optional testing NIPS,CF/SMA,AFP:  NIPS neg, AFP neg     COVID: recommended  Flu: recommended  Tdap: 27-36 weeks  RSV:     Rhogam:  28-32 weeks repeat TPA:  ? Desires Sterilization:     Anatomy US: 24 normal anatomy, anterior placenta, EFW 49.5%, AC 59.9%  FU US:     PROBLEM LIST/PLAN:   Chlamydia/trich - positive on 23, negative on 24        Orders:  -     POC Urinalysis Dipstick            Counseling:   OB precautions, leaking, VB, blossom hankins vs PTL/Labor  [facility]  Round Ligament Pain:  The uterus has several ligaments which provide support and keep the uterus in place. As the  uterus grows these ligaments are pulled and stretched which often causes  sharp stabbing like pain in the inguinal area.   You may find a pregnancy support band helpful. Changing positions may also help. Yoga is a great way to cope with round ligament and low back pain in pregnancy.    Massage may also help with low back pain   Things to Consider at this Point in your Pregnancy:  Some women experience swelling in their feet during pregnancy. Compression stockings may help  Drink plenty of water and stay active   Make sure you are eating frequent small meals, nuts are a wonderful snack to keep with you            Return in about 2 weeks (around 5/14/2024) for Routine OB visit.      We have gone over prenatal care to include the timing and content of visits. I informed her how to contact the office and/or on call person in the event of any problems and encouraged her to do so when she feels it is necessary.  We then spent time answering her questions which she indicated were answered to her satisfaction.    Devi Wylie,   4/30/2024 14:56 EDT

## 2024-04-30 ENCOUNTER — ROUTINE PRENATAL (OUTPATIENT)
Dept: OBSTETRICS AND GYNECOLOGY | Facility: CLINIC | Age: 24
End: 2024-04-30
Payer: MEDICAID

## 2024-04-30 VITALS — BODY MASS INDEX: 41.48 KG/M2 | SYSTOLIC BLOOD PRESSURE: 128 MMHG | DIASTOLIC BLOOD PRESSURE: 84 MMHG | WEIGHT: 226.8 LBS

## 2024-04-30 DIAGNOSIS — Z34.00 SUPERVISION OF NORMAL FIRST PREGNANCY, ANTEPARTUM: Primary | ICD-10-CM

## 2024-04-30 LAB
GLUCOSE UR STRIP-MCNC: NEGATIVE MG/DL
PROT UR STRIP-MCNC: NEGATIVE MG/DL

## 2024-04-30 PROCEDURE — 99213 OFFICE O/P EST LOW 20 MIN: CPT | Performed by: STUDENT IN AN ORGANIZED HEALTH CARE EDUCATION/TRAINING PROGRAM

## 2024-05-15 NOTE — PROGRESS NOTES
Routine Prenatal Visit     Subjective  Macey León is a 23 y.o.  at 34w5d here for her routine OB visit.   She is taking her prenatal vitamins.Reports no loss of fluid or vaginal bleeding. Patient doing well without any complaints. Pregnancy complicated by:     Patient Active Problem List   Diagnosis    Chlamydia infection during pregnancy    Trichomonal vaginitis    Supervision of normal first pregnancy, antepartum         OB History    Para Term  AB Living   1             SAB IAB Ectopic Molar Multiple Live Births                    # Outcome Date GA Lbr Logan/2nd Weight Sex Type Anes PTL Lv   1 Current                    ROS:   General ROS: negative for - chills or fatigue  Genito-Urinary ROS: negative for  change in urinary stream, vaginal discharge     Objective  Physical Exam:   Vitals:    24 1034   BP: 128/87       FHT: 110-160 BPM    General appearance - alert, well appearing, and in no distress  Abdomen- Soft, Gravid uterus, non-tender to palpation  Extremeties: negative swelling       Assessment/Plan:   Diagnoses and all orders for this visit:    1. Supervision of normal first pregnancy, antepartum (Primary)  Assessment & Plan:  GARETT finalized: 24 per LMP, 11-week US and ACOG     Optional testing NIPS,CF/SMA,AFP:  NIPS neg, AFP neg     COVID: recommended  Flu: recommended  Tdap: 27-36 weeks  RSV:     Rhogam:  28-32 weeks repeat TPA:  ? Desires Sterilization:     Anatomy US: 24 normal anatomy, anterior placenta, EFW 49.5%, AC 59.9%  FU US:     PROBLEM LIST/PLAN:   Chlamydia/trich - positive on 23, negative on 24          2. Supervision of other normal pregnancy, antepartum  -     POC Urinalysis Dipstick  -     famotidine (Pepcid) 20 MG tablet; Take 1 tablet by mouth 2 (Two) Times a Day for 60 days.  Dispense: 60 tablet; Refill: 1  -     US Ob 14 + Weeks Single or First Gestation; Future    3. Heartburn  -     famotidine (Pepcid) 20 MG tablet; Take 1 tablet by  mouth 2 (Two) Times a Day for 60 days.  Dispense: 60 tablet; Refill: 1            Counseling:   OB precautions, leaking, VB, blossom hankins vs PTL/Labor  [facility]  Round Ligament Pain:  The uterus has several ligaments which provide support and keep the uterus in place. As the  uterus grows these ligaments are pulled and stretched which often causes sharp stabbing like pain in the inguinal area.   You may find a pregnancy support band helpful. Changing positions may also help. Yoga is a great way to cope with round ligament and low back pain in pregnancy.    Massage may also help with low back pain   Things to Consider at this Point in your Pregnancy:  Some women experience swelling in their feet during pregnancy. Compression stockings may help  Drink plenty of water and stay active   Make sure you are eating frequent small meals, nuts are a wonderful snack to keep with you            Return in about 2 weeks (around 5/30/2024) for Routine OB visit.      We have gone over prenatal care to include the timing and content of visits. I informed her how to contact the office and/or on call person in the event of any problems and encouraged her to do so when she feels it is necessary.  We then spent time answering her questions which she indicated were answered to her satisfaction.    Devi Wylie DO  5/16/2024 11:06 EDT

## 2024-05-16 ENCOUNTER — ROUTINE PRENATAL (OUTPATIENT)
Dept: OBSTETRICS AND GYNECOLOGY | Facility: CLINIC | Age: 24
End: 2024-05-16
Payer: MEDICAID

## 2024-05-16 VITALS — SYSTOLIC BLOOD PRESSURE: 128 MMHG | BODY MASS INDEX: 43.13 KG/M2 | DIASTOLIC BLOOD PRESSURE: 87 MMHG | WEIGHT: 235.8 LBS

## 2024-05-16 DIAGNOSIS — Z34.80 SUPERVISION OF OTHER NORMAL PREGNANCY, ANTEPARTUM: ICD-10-CM

## 2024-05-16 DIAGNOSIS — R12 HEARTBURN: ICD-10-CM

## 2024-05-16 DIAGNOSIS — Z34.00 SUPERVISION OF NORMAL FIRST PREGNANCY, ANTEPARTUM: Primary | ICD-10-CM

## 2024-05-16 LAB
GLUCOSE UR STRIP-MCNC: NEGATIVE MG/DL
PROT UR STRIP-MCNC: NEGATIVE MG/DL

## 2024-05-16 RX ORDER — FAMOTIDINE 20 MG/1
20 TABLET, FILM COATED ORAL 2 TIMES DAILY
Qty: 60 TABLET | Refills: 1 | Status: SHIPPED | OUTPATIENT
Start: 2024-05-16 | End: 2024-05-16

## 2024-05-16 RX ORDER — FAMOTIDINE 20 MG/1
20 TABLET, FILM COATED ORAL 2 TIMES DAILY
Qty: 180 TABLET | Refills: 0 | Status: SHIPPED | OUTPATIENT
Start: 2024-05-16

## 2024-05-30 ENCOUNTER — ROUTINE PRENATAL (OUTPATIENT)
Dept: OBSTETRICS AND GYNECOLOGY | Facility: CLINIC | Age: 24
End: 2024-05-30
Payer: MEDICAID

## 2024-05-30 VITALS — DIASTOLIC BLOOD PRESSURE: 79 MMHG | SYSTOLIC BLOOD PRESSURE: 133 MMHG

## 2024-05-30 DIAGNOSIS — Z34.00 SUPERVISION OF NORMAL FIRST PREGNANCY, ANTEPARTUM: Primary | ICD-10-CM

## 2024-05-30 LAB
GLUCOSE UR STRIP-MCNC: NEGATIVE MG/DL
PROT UR STRIP-MCNC: NEGATIVE MG/DL

## 2024-05-30 PROCEDURE — 87081 CULTURE SCREEN ONLY: CPT | Performed by: STUDENT IN AN ORGANIZED HEALTH CARE EDUCATION/TRAINING PROGRAM

## 2024-05-30 NOTE — ASSESSMENT & PLAN NOTE
GARETT finalized: 6/22/24 per LMP, 11-week US and ACOG    Optional testing NIPS,CF/SMA,AFP:  NIPS neg, AFP neg    COVID: recommended  Flu: recommended  Tdap: 27-36 weeks  RSV:    Rhogam: Apos  28-32 weeks repeat TPA:  ? Desires Sterilization:    Anatomy US: 2/6/24 normal anatomy, anterior placenta, EFW 49.5%, AC 59.9%  FU US: 5/3/2024 EFW 55%, AC 55% cephalic, anterior grade 3 placenta, GENESIS 13.46    PROBLEM LIST/PLAN:   Chlamydia/trich - positive on 12/4/23, negative on 1/8/24

## 2024-05-30 NOTE — PROGRESS NOTES
Routine Prenatal Visit     Subjective  Macey León is a 23 y.o.  at 36w5d here for her routine OB visit.   She is taking her prenatal vitamins.Reports no loss of fluid or vaginal bleeding. Patient doing well without any complaints. Pregnancy complicated by:     Patient Active Problem List   Diagnosis    Chlamydia infection during pregnancy    Trichomonal vaginitis    Supervision of normal first pregnancy, antepartum         OB History    Para Term  AB Living   1             SAB IAB Ectopic Molar Multiple Live Births                    # Outcome Date GA Lbr Logan/2nd Weight Sex Type Anes PTL Lv   1 Current                    ROS:   General ROS: negative for - chills or fatigue  Genito-Urinary ROS: negative for  change in urinary stream, vaginal discharge     Objective  Physical Exam:   Vitals:    24 1650   BP: 133/79       Uterine Size: not examined, US today  FHT: 110-160 BPM    General appearance - alert, well appearing, and in no distress  Abdomen- Soft, Gravid uterus, non-tender to palpation  Extremeties: negative swelling   GBS RV swab performed today  SVE with chaperone present:     Assessment/Plan:   Diagnoses and all orders for this visit:    1. Supervision of normal first pregnancy, antepartum (Primary)  Assessment & Plan:  GARETT finalized: 24 per LMP, 11-week US and ACOG    Optional testing NIPS,CF/SMA,AFP:  NIPS neg, AFP neg    COVID: recommended  Flu: recommended  Tdap: 27-36 weeks  RSV:    Rhogam: Apos  28-32 weeks repeat TPA:  ? Desires Sterilization:    Anatomy US: 24 normal anatomy, anterior placenta, EFW 49.5%, AC 59.9%  FU US: 5/3/2024 EFW 55%, AC 55% cephalic, anterior grade 3 placenta, GENESIS 13.46    PROBLEM LIST/PLAN:   Chlamydia/trich - positive on 23, negative on 24    Orders:  -     POC Urinalysis Dipstick  -     Group B Streptococcus Culture - Swab, Vaginal/Rectum; Future  -     Group B Streptococcus Culture - Swab,  Vaginal/Rectum            Counseling:   OB precautions, leaking, VB, blossom hankins vs PTL/Labor  [facility]  Round Ligament Pain:  The uterus has several ligaments which provide support and keep the uterus in place. As the  uterus grows these ligaments are pulled and stretched which often causes sharp stabbing like pain in the inguinal area.   You may find a pregnancy support band helpful. Changing positions may also help. Yoga is a great way to cope with round ligament and low back pain in pregnancy.    Massage may also help with low back pain   Things to Consider at this Point in your Pregnancy:  Some women experience swelling in their feet during pregnancy. Compression stockings may help  Drink plenty of water and stay active   Make sure you are eating frequent small meals, nuts are a wonderful snack to keep with you            Return in about 1 week (around 6/6/2024) for Routine OB visit.      We have gone over prenatal care to include the timing and content of visits. I informed her how to contact the office and/or on call person in the event of any problems and encouraged her to do so when she feels it is necessary.  We then spent time answering her questions which she indicated were answered to her satisfaction.    Devi Wylie DO  5/30/2024 17:01 EDT

## 2024-05-31 ENCOUNTER — PATIENT OUTREACH (OUTPATIENT)
Dept: LABOR AND DELIVERY | Facility: HOSPITAL | Age: 24
End: 2024-05-31
Payer: MEDICAID

## 2024-05-31 NOTE — OUTREACH NOTE
[facility]  Unable to Reach       Questions/Answers      Flowsheet Row Responses   Pending Outreach Confirm Patient Interest   Call Attempt First   Outcome No answer/busy, MyChart message sent to patient   Next Call Attempt Date 06/03/24              Leanne Connor RN  Maternity Nurse Navigator    5/31/2024, 11:25 EDT

## 2024-06-03 LAB — BACTERIA SPEC AEROBE CULT: ABNORMAL

## 2024-06-03 NOTE — PROGRESS NOTES
Routine Prenatal Visit     Subjective  Macey León is a 23 y.o.  at 37w5d here for her routine OB visit.   She is taking her prenatal vitamins.Reports no loss of fluid or vaginal bleeding. Patient doing well without any complaints. Pregnancy complicated by:     Patient Active Problem List   Diagnosis    Chlamydia infection during pregnancy    Trichomonal vaginitis    Supervision of normal first pregnancy, antepartum         OB History    Para Term  AB Living   1             SAB IAB Ectopic Molar Multiple Live Births                    # Outcome Date GA Lbr Logan/2nd Weight Sex Type Anes PTL Lv   1 Current                    ROS:   General ROS: negative for - chills or fatigue  Genito-Urinary ROS: negative for  change in urinary stream, vaginal discharge     Objective  Physical Exam:   Vitals:    24 0931   BP: 123/84       Uterine Size: size equals dates  FHT: 110-160 BPM    General appearance - alert, well appearing, and in no distress  Abdomen- Soft, Gravid uterus, non-tender to palpation  Extremeties: negative swelling   SVE with chaperone present: -/-3    Assessment/Plan:   Diagnoses and all orders for this visit:    1. Supervision of normal first pregnancy, antepartum (Primary)  Assessment & Plan:  GARETT finalized: 24 per LMP, 11-week US and ACOG    Optional testing NIPS,CF/SMA,AFP:  NIPS neg, AFP neg    COVID: recommended  Flu: recommended  Tdap: 27-36 weeks  RSV:    Rhogam: Apos  28-32 weeks repeat TPA:  ? Desires Sterilization:    Anatomy US: 24 normal anatomy, anterior placenta, EFW 49.5%, AC 59.9%  FU US: 5/3/2024 EFW 55%, AC 55% cephalic, anterior grade 3 placenta, GENESIS 13.46    PROBLEM LIST/PLAN:   Chlamydia/trich - positive on 23, negative on 24      2. Supervision of other normal pregnancy, antepartum  -     POC Urinalysis Dipstick  -     Cancel: Chlamydia trachomatis, Neisseria gonorrhoeae, PCR - , Urine, Clean Catch  -     Chlamydia trachomatis,  Neisseria gonorrhoeae, PCR - , Urine, Random Void    3. Screening examination for STI  -     Cancel: Chlamydia trachomatis, Neisseria gonorrhoeae, PCR - , Urine, Clean Catch  -     Chlamydia trachomatis, Neisseria gonorrhoeae, PCR - , Urine, Random Void      Discussed elective induction of labor at 39+.  Patient would like to think about it we will reevaluate at next office visit.  Labor precautions discussed.        Counseling:   OB precautions, leaking, VB, blossom hankins vs PTL/Labor  [facility]  Round Ligament Pain:  The uterus has several ligaments which provide support and keep the uterus in place. As the  uterus grows these ligaments are pulled and stretched which often causes sharp stabbing like pain in the inguinal area.   You may find a pregnancy support band helpful. Changing positions may also help. Yoga is a great way to cope with round ligament and low back pain in pregnancy.    Massage may also help with low back pain   Things to Consider at this Point in your Pregnancy:  Some women experience swelling in their feet during pregnancy. Compression stockings may help  Drink plenty of water and stay active   Make sure you are eating frequent small meals, nuts are a wonderful snack to keep with you            Return in about 1 week (around 6/13/2024) for Routine OB visit.      We have gone over prenatal care to include the timing and content of visits. I informed her how to contact the office and/or on call person in the event of any problems and encouraged her to do so when she feels it is necessary.  We then spent time answering her questions which she indicated were answered to her satisfaction.    Devi Wylie DO  6/6/2024 09:52 EDT

## 2024-06-06 ENCOUNTER — ROUTINE PRENATAL (OUTPATIENT)
Dept: OBSTETRICS AND GYNECOLOGY | Facility: CLINIC | Age: 24
End: 2024-06-06
Payer: MEDICAID

## 2024-06-06 VITALS — SYSTOLIC BLOOD PRESSURE: 123 MMHG | WEIGHT: 247 LBS | DIASTOLIC BLOOD PRESSURE: 84 MMHG | BODY MASS INDEX: 45.18 KG/M2

## 2024-06-06 DIAGNOSIS — Z34.80 SUPERVISION OF OTHER NORMAL PREGNANCY, ANTEPARTUM: ICD-10-CM

## 2024-06-06 DIAGNOSIS — Z11.3 SCREENING EXAMINATION FOR STI: ICD-10-CM

## 2024-06-06 DIAGNOSIS — Z34.00 SUPERVISION OF NORMAL FIRST PREGNANCY, ANTEPARTUM: Primary | ICD-10-CM

## 2024-06-06 LAB
C TRACH RRNA CVX QL NAA+PROBE: NOT DETECTED
GLUCOSE UR STRIP-MCNC: NEGATIVE MG/DL
N GONORRHOEA RRNA SPEC QL NAA+PROBE: NOT DETECTED
PROT UR STRIP-MCNC: NEGATIVE MG/DL

## 2024-06-06 PROCEDURE — 87591 N.GONORRHOEAE DNA AMP PROB: CPT | Performed by: STUDENT IN AN ORGANIZED HEALTH CARE EDUCATION/TRAINING PROGRAM

## 2024-06-06 PROCEDURE — 87491 CHLMYD TRACH DNA AMP PROBE: CPT | Performed by: STUDENT IN AN ORGANIZED HEALTH CARE EDUCATION/TRAINING PROGRAM

## 2024-06-10 NOTE — PROGRESS NOTES
Routine Prenatal Visit     Subjective  Macey León is a 23 y.o.  at 38w3d here for her routine OB visit.   She is taking her prenatal vitamins.Reports no loss of fluid or vaginal bleeding. Patient doing well without any complaints. Pregnancy complicated by:     Patient Active Problem List   Diagnosis    Chlamydia infection during pregnancy    Trichomonal vaginitis    Supervision of normal first pregnancy, antepartum         OB History    Para Term  AB Living   1             SAB IAB Ectopic Molar Multiple Live Births                    # Outcome Date GA Lbr Logan/2nd Weight Sex Type Anes PTL Lv   1 Current                    ROS:   General ROS: negative for - chills or fatigue  Genito-Urinary ROS: negative for  change in urinary stream, vaginal discharge     Objective  Physical Exam:   Vitals:    24 1450   BP: 139/88       Uterine Size: size equals dates  FHT: 110-160 BPM    General appearance - alert, well appearing, and in no distress  Abdomen- Soft, Gravid uterus, non-tender to palpation  Extremeties: negative swelling       Assessment/Plan:   Diagnoses and all orders for this visit:    1. Supervision of normal first pregnancy, antepartum (Primary)  Assessment & Plan:  GARETT finalized: 24 per LMP, 11-week US and ACOG    Optional testing NIPS,CF/SMA,AFP:  NIPS neg, AFP neg    COVID: recommended  Flu: recommended  Tdap: 27-36 weeks  RSV:    Rhogam: Apos  28-32 weeks repeat TPA:  ? Desires Sterilization:    Anatomy US: 24 normal anatomy, anterior placenta, EFW 49.5%, AC 59.9%  FU US: 5/3/2024 EFW 55%, AC 55% cephalic, anterior grade 3 placenta, GENESIS 13.46    PROBLEM LIST/PLAN:   Chlamydia/trich - positive on 23, negative on 24    Orders:  -     POC Urinalysis Dipstick  -     sodium chloride 0.9 % flush 10 mL  -     sodium chloride 0.9 % flush 10 mL  -     sodium chloride 0.9 % infusion 40 mL  -     lidocaine PF 1% (XYLOCAINE) injection 0.5 mL  -     lactated ringers bolus  1,000 mL  -     lactated ringers infusion  -     acetaminophen (TYLENOL) tablet 650 mg  -     Morphine injection 5 mg  -     ondansetron ODT (ZOFRAN-ODT) disintegrating tablet 4 mg  -     ondansetron (ZOFRAN) injection 4 mg  -     promethazine (PHENERGAN) tablet 12.5 mg  -     metoclopramide (REGLAN) 10 mg in sodium chloride 0.9 % 50 mL IVPB  -     terbutaline (BRETHINE) injection 0.25 mg  -     famotidine (PEPCID) injection 20 mg  -     famotidine (PEPCID) tablet 20 mg  -     Sod Citrate-Citric Acid (BICITRA) oral solution 30 mL  -     acetaminophen (TYLENOL) tablet 650 mg  -     ibuprofen (ADVIL,MOTRIN) tablet 800 mg  -     HYDROcodone-acetaminophen (NORCO) 5-325 MG per tablet 1 tablet  -     HYDROcodone-acetaminophen (NORCO)  MG per tablet 1 tablet  -     methylergonovine (METHERGINE) injection 200 mcg  -     miSOPROStol (CYTOTEC) tablet 800 mcg  -     ondansetron ODT (ZOFRAN-ODT) disintegrating tablet 4 mg  -     ondansetron (ZOFRAN) injection 4 mg  -     promethazine (PHENERGAN) tablet 25 mg  -     famotidine (PEPCID) injection 20 mg  -     famotidine (PEPCID) tablet 20 mg  -     penicillin G potassium 5 Million Units in sodium chloride 0.9 % 100 mL IVPB  -     penicillin G potassium 2.5 Million Units in sodium chloride 0.9 % 100 mL IVPB  -     oxytocin (PITOCIN) 30 units in 0.9% sodium chloride 500 mL (premix)  -     oxytocin (PITOCIN) 30 units in 0.9% sodium chloride 500 mL (premix)    Other orders  -     Admit To Obstetrics Inpatient; Standing  -     Code Status and Medical Interventions:; Standing  -     Obtain Informed Consent; Standing  -     Place Sequential Compression Device; Standing  -     Maintain Sequential Compression Device; Standing  -     Place Sequential Compression Device; Standing  -     Maintain Sequential Compression Device; Standing  -     Vital Signs q 4 while awake; Standing  -     Vital Signs Per Hospital Policy; Standing  -     Confirm Presence of Amniotic Fluid if Patient  Presents With SROM; Standing  -     Keep Exams to a Minimum in Patient With SROM; Standing  -     Mini-Prep Prior to Delivery; Standing  -     Continuous Fetal Monitoring With NST on Admission and Prior to Initiation of Oxytocin.; Standing  -     External Uterine Contraction Monitoring; Standing  -     Notify Provider (Specified); Standing  -     Notify Provider of Tachysystole (Per Hospital Algorithm); Standing  -     Notify Provider if Membranes Ruptured, Bleeding Greater Than 1 Pad Per Hour, Fetal Heart Tone Abnormality or Severe Pain; Standing  -     May Ambulate if Membranes Intact or Head Engaged With Ruptured BOW or Normal Tracing for 20 Minutes; Standing  -     Patient May Shower; Standing  -     POC Glucose PRN; Standing  -     Intake and Output (If on Tocolytics); Standing  -     Cervical Exam Every 1-2 Hours When in Active Labor or At RN Discretion, Unless Contraindicated; Standing  -     Initiate Group Beta Strep (GBS) Prophylaxis Protocol, If Criteria Met; Standing  -     Bedside Ultrasound for Fetal Presentation; Standing  -     Position Change - For Intra-Uterine Resusitation for Hypertonus, HyperStimulation or Non-Reassuring Fetal Status; Standing  -     Insert Indwelling Urinary Catheter; Standing  -     Assess Need for Indwelling Urinary Catheter - Follow Removal Protocol; Standing  -     Urinary Catheter Care; Standing  -     Saurez Bulb Cervical Ripening With Infusion; Standing  -     NPO Diet NPO Type: Ice Chips; Standing  -     Inpatient Anesthesiology Consult; Standing  -     CBC (No Diff); Standing  -     T Pallidum Antibody w/ reflex RPR (Syphilis); Standing  -     Urine Drug Screen - Urine, Clean Catch; Standing  -     Type & Screen; Standing  -     Insert Peripheral IV; Standing  -     Saline Lock & Maintain IV Access; Standing  -     Notify Provider (Specified); Standing  -     Vital Signs Per Hospital Policy; Standing  -     Up as Tolerated; Standing  -     Fundal & Lochia Check;  Standing  -     Apply Ice to Perineum; Standing  -     Bladder Assessment; Standing  -     Diet: Regular/House; Fluid Consistency: Thin (IDDSI 0); Standing  -     Blood Gas, Arterial, Cord; Standing  -     Blood Gas, Venous, Cord; Standing  -     If indicated -- Please administer RH Immunoglobulin based on results of cord blood evaluation and fetal screen lab tests, pharmacy to dispense; Standing            Counseling:   OB precautions, leaking, VB, blossom hankins vs PTL/Labor  [facility]  HTN precautions reviewed: HA, vision change, RUQ/epigastric pain, edema  IOL scheduled  Round Ligament Pain:  The uterus has several ligaments which provide support and keep the uterus in place. As the  uterus grows these ligaments are pulled and stretched which often causes sharp stabbing like pain in the inguinal area.   You may find a pregnancy support band helpful. Changing positions may also help. Yoga is a great way to cope with round ligament and low back pain in pregnancy.    Massage may also help with low back pain   Things to Consider at this Point in your Pregnancy:  Some women experience swelling in their feet during pregnancy. Compression stockings may help  Drink plenty of water and stay active   Make sure you are eating frequent small meals, nuts are a wonderful snack to keep with you            Return in about 1 week (around 6/18/2024) for Routine OB visit.      We have gone over prenatal care to include the timing and content of visits. I informed her how to contact the office and/or on call person in the event of any problems and encouraged her to do so when she feels it is necessary.  We then spent time answering her questions which she indicated were answered to her satisfaction.    Devi Wylie,   6/11/2024 15:18 EDT

## 2024-06-11 ENCOUNTER — ROUTINE PRENATAL (OUTPATIENT)
Dept: OBSTETRICS AND GYNECOLOGY | Facility: CLINIC | Age: 24
End: 2024-06-11
Payer: MEDICAID

## 2024-06-11 VITALS — DIASTOLIC BLOOD PRESSURE: 88 MMHG | SYSTOLIC BLOOD PRESSURE: 139 MMHG | BODY MASS INDEX: 46.53 KG/M2 | WEIGHT: 254.4 LBS

## 2024-06-11 DIAGNOSIS — Z34.00 SUPERVISION OF NORMAL FIRST PREGNANCY, ANTEPARTUM: Primary | ICD-10-CM

## 2024-06-11 LAB
GLUCOSE UR STRIP-MCNC: NEGATIVE MG/DL
PROT UR STRIP-MCNC: NEGATIVE MG/DL

## 2024-06-11 RX ORDER — FAMOTIDINE 10 MG
20 TABLET ORAL 2 TIMES DAILY PRN
OUTPATIENT
Start: 2024-06-11

## 2024-06-11 RX ORDER — SODIUM CHLORIDE 0.9 % (FLUSH) 0.9 %
10 SYRINGE (ML) INJECTION AS NEEDED
OUTPATIENT
Start: 2024-06-11

## 2024-06-11 RX ORDER — FAMOTIDINE 10 MG/ML
20 INJECTION, SOLUTION INTRAVENOUS ONCE AS NEEDED
OUTPATIENT
Start: 2024-06-11

## 2024-06-11 RX ORDER — SODIUM CHLORIDE 9 MG/ML
40 INJECTION, SOLUTION INTRAVENOUS AS NEEDED
OUTPATIENT
Start: 2024-06-11

## 2024-06-11 RX ORDER — METHYLERGONOVINE MALEATE 0.2 MG/ML
200 INJECTION INTRAVENOUS ONCE AS NEEDED
OUTPATIENT
Start: 2024-06-11

## 2024-06-11 RX ORDER — ONDANSETRON 2 MG/ML
4 INJECTION INTRAMUSCULAR; INTRAVENOUS EVERY 6 HOURS PRN
OUTPATIENT
Start: 2024-06-11

## 2024-06-11 RX ORDER — ACETAMINOPHEN 325 MG/1
650 TABLET ORAL EVERY 4 HOURS PRN
OUTPATIENT
Start: 2024-06-11

## 2024-06-11 RX ORDER — SODIUM CHLORIDE 0.9 % (FLUSH) 0.9 %
10 SYRINGE (ML) INJECTION EVERY 12 HOURS SCHEDULED
OUTPATIENT
Start: 2024-06-11

## 2024-06-11 RX ORDER — FAMOTIDINE 10 MG/ML
20 INJECTION, SOLUTION INTRAVENOUS 2 TIMES DAILY PRN
OUTPATIENT
Start: 2024-06-11

## 2024-06-11 RX ORDER — IBUPROFEN 200 MG
800 TABLET ORAL ONCE AS NEEDED
OUTPATIENT
Start: 2024-06-11

## 2024-06-11 RX ORDER — PROMETHAZINE HYDROCHLORIDE 12.5 MG/1
25 TABLET ORAL EVERY 6 HOURS PRN
OUTPATIENT
Start: 2024-06-11

## 2024-06-11 RX ORDER — MORPHINE SULFATE 10 MG/ML
5 INJECTION INTRAMUSCULAR; INTRAVENOUS; SUBCUTANEOUS
OUTPATIENT
Start: 2024-06-11

## 2024-06-11 RX ORDER — ONDANSETRON 4 MG/1
4 TABLET, ORALLY DISINTEGRATING ORAL EVERY 6 HOURS PRN
OUTPATIENT
Start: 2024-06-11

## 2024-06-11 RX ORDER — PROMETHAZINE HYDROCHLORIDE 12.5 MG/1
12.5 TABLET ORAL EVERY 6 HOURS PRN
OUTPATIENT
Start: 2024-06-11

## 2024-06-11 RX ORDER — SODIUM CHLORIDE, SODIUM LACTATE, POTASSIUM CHLORIDE, CALCIUM CHLORIDE 600; 310; 30; 20 MG/100ML; MG/100ML; MG/100ML; MG/100ML
125 INJECTION, SOLUTION INTRAVENOUS CONTINUOUS
OUTPATIENT
Start: 2024-06-11

## 2024-06-11 RX ORDER — CITRIC ACID/SODIUM CITRATE 334-500MG
30 SOLUTION, ORAL ORAL ONCE AS NEEDED
OUTPATIENT
Start: 2024-06-11

## 2024-06-11 RX ORDER — HYDROCODONE BITARTRATE AND ACETAMINOPHEN 5; 325 MG/1; MG/1
1 TABLET ORAL EVERY 4 HOURS PRN
OUTPATIENT
Start: 2024-06-11 | End: 2024-06-18

## 2024-06-11 RX ORDER — FAMOTIDINE 10 MG
20 TABLET ORAL ONCE AS NEEDED
OUTPATIENT
Start: 2024-06-11

## 2024-06-11 RX ORDER — LIDOCAINE HYDROCHLORIDE 10 MG/ML
0.5 INJECTION, SOLUTION EPIDURAL; INFILTRATION; INTRACAUDAL; PERINEURAL ONCE AS NEEDED
OUTPATIENT
Start: 2024-06-11

## 2024-06-11 RX ORDER — MISOPROSTOL 100 UG/1
800 TABLET ORAL AS NEEDED
OUTPATIENT
Start: 2024-06-11

## 2024-06-11 RX ORDER — OXYTOCIN/0.9 % SODIUM CHLORIDE 30/500 ML
250 PLASTIC BAG, INJECTION (ML) INTRAVENOUS CONTINUOUS
OUTPATIENT
Start: 2024-06-11 | End: 2024-06-11

## 2024-06-11 RX ORDER — OXYTOCIN/0.9 % SODIUM CHLORIDE 30/500 ML
999 PLASTIC BAG, INJECTION (ML) INTRAVENOUS ONCE
OUTPATIENT
Start: 2024-06-11 | End: 2024-06-11

## 2024-06-11 RX ORDER — ACETAMINOPHEN 325 MG/1
650 TABLET ORAL ONCE AS NEEDED
OUTPATIENT
Start: 2024-06-11

## 2024-06-11 RX ORDER — HYDROCODONE BITARTRATE AND ACETAMINOPHEN 10; 325 MG/1; MG/1
1 TABLET ORAL EVERY 4 HOURS PRN
OUTPATIENT
Start: 2024-06-11 | End: 2024-06-18

## 2024-06-11 RX ORDER — TERBUTALINE SULFATE 1 MG/ML
0.25 INJECTION, SOLUTION SUBCUTANEOUS AS NEEDED
OUTPATIENT
Start: 2024-06-11

## 2024-06-14 NOTE — PROGRESS NOTES
Routine Prenatal Visit     Subjective  Macey León is a 23 y.o.  at 39w3d here for her routine OB visit.   She is taking her prenatal vitamins.Reports no loss of fluid or vaginal bleeding. Patient doing well. Occasional headaches. Pregnancy complicated by:     Patient Active Problem List   Diagnosis    Chlamydia infection during pregnancy    Trichomonal vaginitis    Supervision of normal first pregnancy, antepartum         OB History    Para Term  AB Living   1             SAB IAB Ectopic Molar Multiple Live Births                    # Outcome Date GA Lbr Logan/2nd Weight Sex Type Anes PTL Lv   1 Current                    ROS:   General ROS: negative for - chills or fatigue  Genito-Urinary ROS: negative for  change in urinary stream, vaginal discharge     Objective  Physical Exam:   Vitals:    24 1321   BP: 138/89       Uterine Size: size equals dates  FHT: 110-160 BPM    General appearance - alert, well appearing, and in no distress  Abdomen- Soft, Gravid uterus, non-tender to palpation  Extremeties: negative swelling   SVE with chaperone present: 0-50/-2    Assessment/Plan:   Diagnoses and all orders for this visit:    1. Supervision of normal first pregnancy, antepartum (Primary)  Assessment & Plan:  GARETT finalized: 24 per LMP, 11-week US and ACOG    Optional testing NIPS,CF/SMA,AFP:  NIPS neg, AFP neg    COVID: recommended  Flu: recommended  Tdap: 27-36 weeks  RSV:    Rhogam: Apos  28-32 weeks repeat TPA:  ? Desires Sterilization:    Anatomy US: 24 normal anatomy, anterior placenta, EFW 49.5%, AC 59.9%  FU US: 5/3/2024 EFW 55%, AC 55% cephalic, anterior grade 3 placenta, GENESIS 13.46    PROBLEM LIST/PLAN:   Chlamydia/trich - positive on 23, negative on 24    Orders:  -     POC Urinalysis Dipstick    2. Elevated blood pressure reading in office without diagnosis of hypertension            Counseling:   OB precautions, leaking, VB, blossom hankins vs  PTL/Labor  FKC  HTN precautions reviewed: HA, vision change, RUQ/epigastric pain, edema  SENT TO L+D for evaluation of BP, NST and Labs.  L+D charge nurse and covering OB/GYN hospitalist notified.  SANDI pt, questions answered.  Round Ligament Pain:  The uterus has several ligaments which provide support and keep the uterus in place. As the  uterus grows these ligaments are pulled and stretched which often causes sharp stabbing like pain in the inguinal area.   You may find a pregnancy support band helpful. Changing positions may also help. Yoga is a great way to cope with round ligament and low back pain in pregnancy.    Massage may also help with low back pain   Things to Consider at this Point in your Pregnancy:  Some women experience swelling in their feet during pregnancy. Compression stockings may help  Drink plenty of water and stay active   Make sure you are eating frequent small meals, nuts are a wonderful snack to keep with you            Return in about 6 weeks (around 7/30/2024) for Postpartum.      We have gone over prenatal care to include the timing and content of visits. I informed her how to contact the office and/or on call person in the event of any problems and encouraged her to do so when she feels it is necessary.  We then spent time answering her questions which she indicated were answered to her satisfaction.    Devi Wylie,   6/18/2024 13:33 EDT

## 2024-06-18 ENCOUNTER — ROUTINE PRENATAL (OUTPATIENT)
Dept: OBSTETRICS AND GYNECOLOGY | Facility: CLINIC | Age: 24
End: 2024-06-18
Payer: MEDICAID

## 2024-06-18 ENCOUNTER — HOSPITAL ENCOUNTER (INPATIENT)
Facility: HOSPITAL | Age: 24
LOS: 3 days | Discharge: HOME | End: 2024-06-21
Attending: OBSTETRICS & GYNECOLOGY | Admitting: OBSTETRICS & GYNECOLOGY
Payer: MEDICAID

## 2024-06-18 VITALS — WEIGHT: 255 LBS | DIASTOLIC BLOOD PRESSURE: 89 MMHG | SYSTOLIC BLOOD PRESSURE: 138 MMHG | BODY MASS INDEX: 46.64 KG/M2

## 2024-06-18 DIAGNOSIS — R03.0 ELEVATED BLOOD PRESSURE READING IN OFFICE WITHOUT DIAGNOSIS OF HYPERTENSION: ICD-10-CM

## 2024-06-18 DIAGNOSIS — Z34.00 SUPERVISION OF NORMAL FIRST PREGNANCY, ANTEPARTUM: ICD-10-CM

## 2024-06-18 DIAGNOSIS — Z34.00 SUPERVISION OF NORMAL FIRST PREGNANCY, ANTEPARTUM: Primary | ICD-10-CM

## 2024-06-18 PROBLEM — Z37.9 NORMAL LABOR: Status: ACTIVE | Noted: 2024-06-18

## 2024-06-18 LAB
ABO GROUP BLD: NORMAL
ABO GROUP BLD: NORMAL
ALBUMIN SERPL-MCNC: 3.3 G/DL (ref 3.5–5.2)
ALBUMIN/GLOB SERPL: 1.1 G/DL
ALP SERPL-CCNC: 182 U/L (ref 39–117)
ALT SERPL W P-5'-P-CCNC: 15 U/L (ref 1–33)
AMPHET+METHAMPHET UR QL: NEGATIVE
ANION GAP SERPL CALCULATED.3IONS-SCNC: 12.1 MMOL/L (ref 5–15)
AST SERPL-CCNC: 19 U/L (ref 1–32)
BARBITURATES UR QL SCN: NEGATIVE
BENZODIAZ UR QL SCN: NEGATIVE
BILIRUB SERPL-MCNC: 0.2 MG/DL (ref 0–1.2)
BLD GP AB SCN SERPL QL: NEGATIVE
BUN SERPL-MCNC: 5 MG/DL (ref 6–20)
BUN/CREAT SERPL: 8.9 (ref 7–25)
CALCIUM SPEC-SCNC: 8.6 MG/DL (ref 8.6–10.5)
CANNABINOIDS SERPL QL: NEGATIVE
CHLORIDE SERPL-SCNC: 103 MMOL/L (ref 98–107)
CO2 SERPL-SCNC: 18.9 MMOL/L (ref 22–29)
COCAINE UR QL: NEGATIVE
CREAT SERPL-MCNC: 0.56 MG/DL (ref 0.57–1)
CREAT UR-MCNC: 59.1 MG/DL
DEPRECATED RDW RBC AUTO: 48 FL (ref 37–54)
EGFRCR SERPLBLD CKD-EPI 2021: 131.7 ML/MIN/1.73
ERYTHROCYTE [DISTWIDTH] IN BLOOD BY AUTOMATED COUNT: 15.6 % (ref 12.3–15.4)
FENTANYL UR-MCNC: NEGATIVE NG/ML
GLOBULIN UR ELPH-MCNC: 3.1 GM/DL
GLUCOSE SERPL-MCNC: 80 MG/DL (ref 65–99)
GLUCOSE UR STRIP-MCNC: NEGATIVE MG/DL
HCT VFR BLD AUTO: 36.9 % (ref 34–46.6)
HGB BLD-MCNC: 12.2 G/DL (ref 12–15.9)
MCH RBC QN AUTO: 27.8 PG (ref 26.6–33)
MCHC RBC AUTO-ENTMCNC: 33.1 G/DL (ref 31.5–35.7)
MCV RBC AUTO: 84.1 FL (ref 79–97)
METHADONE UR QL SCN: NEGATIVE
OPIATES UR QL: NEGATIVE
OXYCODONE UR QL SCN: NEGATIVE
PLATELET # BLD AUTO: 263 10*3/MM3 (ref 140–450)
PMV BLD AUTO: 11.5 FL (ref 6–12)
POTASSIUM SERPL-SCNC: 4 MMOL/L (ref 3.5–5.2)
PROT ?TM UR-MCNC: 12.8 MG/DL
PROT SERPL-MCNC: 6.4 G/DL (ref 6–8.5)
PROT UR STRIP-MCNC: NEGATIVE MG/DL
PROT/CREAT UR: 0.22 MG/G{CREAT}
RBC # BLD AUTO: 4.39 10*6/MM3 (ref 3.77–5.28)
RH BLD: POSITIVE
RH BLD: POSITIVE
SODIUM SERPL-SCNC: 134 MMOL/L (ref 136–145)
T PALLIDUM IGG SER QL: NORMAL
T&S EXPIRATION DATE: NORMAL
WBC NRBC COR # BLD AUTO: 8.59 10*3/MM3 (ref 3.4–10.8)

## 2024-06-18 PROCEDURE — 80307 DRUG TEST PRSMV CHEM ANLYZR: CPT | Performed by: OBSTETRICS & GYNECOLOGY

## 2024-06-18 PROCEDURE — 84156 ASSAY OF PROTEIN URINE: CPT | Performed by: STUDENT IN AN ORGANIZED HEALTH CARE EDUCATION/TRAINING PROGRAM

## 2024-06-18 PROCEDURE — 82570 ASSAY OF URINE CREATININE: CPT | Performed by: STUDENT IN AN ORGANIZED HEALTH CARE EDUCATION/TRAINING PROGRAM

## 2024-06-18 PROCEDURE — 86780 TREPONEMA PALLIDUM: CPT | Performed by: OBSTETRICS & GYNECOLOGY

## 2024-06-18 PROCEDURE — 86900 BLOOD TYPING SEROLOGIC ABO: CPT | Performed by: OBSTETRICS & GYNECOLOGY

## 2024-06-18 PROCEDURE — 85027 COMPLETE CBC AUTOMATED: CPT | Performed by: STUDENT IN AN ORGANIZED HEALTH CARE EDUCATION/TRAINING PROGRAM

## 2024-06-18 PROCEDURE — 25010000002 PENICILLIN G POTASSIUM PER 600000 UNITS: Performed by: OBSTETRICS & GYNECOLOGY

## 2024-06-18 PROCEDURE — 86900 BLOOD TYPING SEROLOGIC ABO: CPT

## 2024-06-18 PROCEDURE — 59025 FETAL NON-STRESS TEST: CPT

## 2024-06-18 PROCEDURE — 86850 RBC ANTIBODY SCREEN: CPT | Performed by: OBSTETRICS & GYNECOLOGY

## 2024-06-18 PROCEDURE — 86901 BLOOD TYPING SEROLOGIC RH(D): CPT | Performed by: OBSTETRICS & GYNECOLOGY

## 2024-06-18 PROCEDURE — 25810000003 LACTATED RINGERS PER 1000 ML: Performed by: OBSTETRICS & GYNECOLOGY

## 2024-06-18 PROCEDURE — 80053 COMPREHEN METABOLIC PANEL: CPT | Performed by: STUDENT IN AN ORGANIZED HEALTH CARE EDUCATION/TRAINING PROGRAM

## 2024-06-18 PROCEDURE — 99213 OFFICE O/P EST LOW 20 MIN: CPT | Performed by: STUDENT IN AN ORGANIZED HEALTH CARE EDUCATION/TRAINING PROGRAM

## 2024-06-18 PROCEDURE — 86901 BLOOD TYPING SEROLOGIC RH(D): CPT

## 2024-06-18 PROCEDURE — 3E0DXGC INTRODUCTION OF OTHER THERAPEUTIC SUBSTANCE INTO MOUTH AND PHARYNX, EXTERNAL APPROACH: ICD-10-PCS | Performed by: OBSTETRICS & GYNECOLOGY

## 2024-06-18 RX ORDER — CITRIC ACID/SODIUM CITRATE 334-500MG
30 SOLUTION, ORAL ORAL ONCE AS NEEDED
Status: DISCONTINUED | OUTPATIENT
Start: 2024-06-18 | End: 2024-06-19 | Stop reason: HOSPADM

## 2024-06-18 RX ORDER — TERBUTALINE SULFATE 1 MG/ML
0.25 INJECTION, SOLUTION SUBCUTANEOUS AS NEEDED
Status: DISCONTINUED | OUTPATIENT
Start: 2024-06-18 | End: 2024-06-19 | Stop reason: HOSPADM

## 2024-06-18 RX ORDER — CARBOPROST TROMETHAMINE 250 UG/ML
250 INJECTION, SOLUTION INTRAMUSCULAR AS NEEDED
Status: DISCONTINUED | OUTPATIENT
Start: 2024-06-18 | End: 2024-06-19 | Stop reason: HOSPADM

## 2024-06-18 RX ORDER — SODIUM CHLORIDE, SODIUM LACTATE, POTASSIUM CHLORIDE, CALCIUM CHLORIDE 600; 310; 30; 20 MG/100ML; MG/100ML; MG/100ML; MG/100ML
125 INJECTION, SOLUTION INTRAVENOUS CONTINUOUS
Status: DISCONTINUED | OUTPATIENT
Start: 2024-06-18 | End: 2024-06-19

## 2024-06-18 RX ORDER — ONDANSETRON 2 MG/ML
4 INJECTION INTRAMUSCULAR; INTRAVENOUS EVERY 6 HOURS PRN
Status: DISCONTINUED | OUTPATIENT
Start: 2024-06-18 | End: 2024-06-19 | Stop reason: HOSPADM

## 2024-06-18 RX ORDER — OXYTOCIN/0.9 % SODIUM CHLORIDE 30/500 ML
2-20 PLASTIC BAG, INJECTION (ML) INTRAVENOUS
Status: DISCONTINUED | OUTPATIENT
Start: 2024-06-18 | End: 2024-06-19

## 2024-06-18 RX ORDER — LIDOCAINE HYDROCHLORIDE 10 MG/ML
0.5 INJECTION, SOLUTION EPIDURAL; INFILTRATION; INTRACAUDAL; PERINEURAL ONCE AS NEEDED
Status: DISCONTINUED | OUTPATIENT
Start: 2024-06-18 | End: 2024-06-19 | Stop reason: HOSPADM

## 2024-06-18 RX ORDER — SODIUM CHLORIDE 9 MG/ML
40 INJECTION, SOLUTION INTRAVENOUS AS NEEDED
Status: DISCONTINUED | OUTPATIENT
Start: 2024-06-18 | End: 2024-06-19 | Stop reason: HOSPADM

## 2024-06-18 RX ORDER — METHYLERGONOVINE MALEATE 0.2 MG/ML
200 INJECTION INTRAVENOUS ONCE AS NEEDED
Status: DISCONTINUED | OUTPATIENT
Start: 2024-06-18 | End: 2024-06-19 | Stop reason: HOSPADM

## 2024-06-18 RX ORDER — MISOPROSTOL 100 MCG
25 TABLET ORAL ONCE
Status: COMPLETED | OUTPATIENT
Start: 2024-06-18 | End: 2024-06-18

## 2024-06-18 RX ORDER — ONDANSETRON 4 MG/1
4 TABLET, ORALLY DISINTEGRATING ORAL EVERY 6 HOURS PRN
Status: DISCONTINUED | OUTPATIENT
Start: 2024-06-18 | End: 2024-06-19 | Stop reason: HOSPADM

## 2024-06-18 RX ORDER — SODIUM CHLORIDE 0.9 % (FLUSH) 0.9 %
10 SYRINGE (ML) INJECTION EVERY 12 HOURS SCHEDULED
Status: DISCONTINUED | OUTPATIENT
Start: 2024-06-18 | End: 2024-06-19 | Stop reason: HOSPADM

## 2024-06-18 RX ORDER — MISOPROSTOL 200 UG/1
800 TABLET ORAL AS NEEDED
Status: DISCONTINUED | OUTPATIENT
Start: 2024-06-18 | End: 2024-06-19 | Stop reason: HOSPADM

## 2024-06-18 RX ORDER — ACETAMINOPHEN 325 MG/1
650 TABLET ORAL EVERY 4 HOURS PRN
Status: DISCONTINUED | OUTPATIENT
Start: 2024-06-18 | End: 2024-06-19 | Stop reason: HOSPADM

## 2024-06-18 RX ORDER — SODIUM CHLORIDE 0.9 % (FLUSH) 0.9 %
10 SYRINGE (ML) INJECTION AS NEEDED
Status: DISCONTINUED | OUTPATIENT
Start: 2024-06-18 | End: 2024-06-19 | Stop reason: HOSPADM

## 2024-06-18 RX ADMIN — PENICILLIN G POTASSIUM 2.5 MILLION UNITS: 5000000 INJECTION, POWDER, FOR SOLUTION INTRAMUSCULAR; INTRAVENOUS at 20:49

## 2024-06-18 RX ADMIN — SODIUM CHLORIDE, POTASSIUM CHLORIDE, SODIUM LACTATE AND CALCIUM CHLORIDE 125 ML/HR: 600; 310; 30; 20 INJECTION, SOLUTION INTRAVENOUS at 16:40

## 2024-06-18 RX ADMIN — PENICILLIN G POTASSIUM 5 MILLION UNITS: 5000000 INJECTION, POWDER, FOR SOLUTION INTRAMUSCULAR; INTRAVENOUS at 17:39

## 2024-06-18 RX ADMIN — Medication 2 MILLI-UNITS/MIN: at 22:29

## 2024-06-18 RX ADMIN — Medication 25 MCG: at 17:39

## 2024-06-18 RX ADMIN — SODIUM CHLORIDE, POTASSIUM CHLORIDE, SODIUM LACTATE AND CALCIUM CHLORIDE 125 ML/HR: 600; 310; 30; 20 INJECTION, SOLUTION INTRAVENOUS at 22:17

## 2024-06-18 NOTE — NURSING NOTE
"Pt was sent over from Dr. Wylie for elevated B. See orders for labs. Pt states HA off and on and \"sees spots\" at times. BLE edema noted.  "

## 2024-06-18 NOTE — PLAN OF CARE
Problem: Adult Inpatient Plan of Care  Goal: Plan of Care Review  Outcome: Ongoing, Progressing  Goal: Patient-Specific Goal (Individualized)  Outcome: Ongoing, Progressing  Goal: Absence of Hospital-Acquired Illness or Injury  Outcome: Ongoing, Progressing  Intervention: Identify and Manage Fall Risk  Recent Flowsheet Documentation  Taken 6/18/2024 1800 by Macey Gomes RN  Safety Promotion/Fall Prevention: safety round/check completed  Intervention: Prevent and Manage VTE (Venous Thromboembolism) Risk  Recent Flowsheet Documentation  Taken 6/18/2024 1800 by Macey Gomes RN  Activity Management: up ad rowena  Goal: Optimal Comfort and Wellbeing  Outcome: Ongoing, Progressing  Intervention: Provide Person-Centered Care  Recent Flowsheet Documentation  Taken 6/18/2024 1800 by Macey Gomes RN  Trust Relationship/Rapport:   care explained   choices provided   empathic listening provided   questions answered  Goal: Readiness for Transition of Care  Outcome: Ongoing, Progressing  Intervention: Mutually Develop Transition Plan  Recent Flowsheet Documentation  Taken 6/18/2024 1700 by Macey Gomes RN  Equipment Currently Used at Home: none  Taken 6/18/2024 1658 by Macey Gomes RN  Transportation Anticipated: car, drives self  Patient/Family Anticipated Services at Transition: none  Patient/Family Anticipates Transition to: home     Problem: Bleeding (Labor)  Goal: Hemostasis  Outcome: Ongoing, Progressing     Problem: Change in Fetal Wellbeing (Labor)  Goal: Stable Fetal Wellbeing  Outcome: Ongoing, Progressing     Problem: Delayed Labor Progression (Labor)  Goal: Effective Progression to Delivery  Outcome: Ongoing, Progressing     Problem: Infection (Labor)  Goal: Absence of Infection Signs and Symptoms  Outcome: Ongoing, Progressing     Problem: Labor Pain (Labor)  Goal: Acceptable Pain Control  Outcome: Ongoing, Progressing     Problem: Uterine Tachysystole (Labor)  Goal: Normal Uterine Contraction  Pattern  Outcome: Ongoing, Progressing   Goal Outcome Evaluation:

## 2024-06-18 NOTE — H&P
[facility]  Obstetric History and Physical    Chief Complaint   Patient presents with    Elevated Blood Pressure       Subjective     PNC provided by:  LUIGI    HPI:    Patient is a 23 y.o. female  currently at 39w3d, who presents with elevated BP in office;  pt feels good and denies HA, vision changes or RUQ pain.    Her prenatal care is complicated by  sexually transmitted disease  chlamydia and trichomoniasis.  Her previous obstetric/gynecological history is noted for is non-contributory.    The following portions of the patients history were reviewed and updated as appropriate:   current medications, allergies, past medical history, past surgical history, past family history, past social history and current problem list.     Prenatal Information:  Prenatal Results       Initial Prenatal Labs       Test Value Reference Range Date Time    Hemoglobin  12.1 g/dL 11.1 - 15.9 23 1020    Hematocrit  36.0 % 34.0 - 46.6 23 1020    Platelets  274 x10E3/uL 150 - 450 23 1020    Rubella IgG  3.72 index Immune >0.99 23 1020    Hepatitis B SAg  Negative  Negative 23 1020    Hepatitis C Ab  Non Reactive  Non Reactive 23 1020    RPR  Non Reactive  Non Reactive 23 1020    T. Pallidum Ab         ABO  A   23 1020    Rh  Positive   23 1020    Antibody Screen  Negative  Negative 23 1020    HIV  Non Reactive  Non Reactive 23 1020    Urine Culture  Final report   23 0928    Gonorrhea  Not Detected  Not Detected  24 0945       Negative  Negative 24 0941       Negative  Negative 23 1102    Chlamydia  Not Detected  Not Detected  24 0945       Negative  Negative 24 0941       Positive  Negative 23 1102    TSH        HgB A1c         Varicella IgG        Hemoglobinopathy Fractionation        Hemoglobinopathy (genetic testing)        Cystic fibrosis                   Fetal testing        Test Value Reference Range Date Time    NIPT         MSAFP  *Screen Negative*   01/08/24 0944    AFP-4                  2nd and 3rd Trimester       Test Value Reference Range Date Time    Hemoglobin (repeated)  12.2 g/dL 12.0 - 15.9 06/18/24 1407       10.9 g/dL 12.0 - 15.9 03/05/24 0932    Hematocrit (repeated)  36.9 % 34.0 - 46.6 06/18/24 1407       34.5 % 34.0 - 46.6 03/05/24 0932    Platelets   263 10*3/mm3 140 - 450 06/18/24 1407       335 10*3/mm3 140 - 450 03/05/24 0932       274 x10E3/uL 150 - 450 12/04/23 1020    1 hour GTT   135 mg/dL 65 - 139 03/05/24 0932    Antibody Screen (repeated)        3rd TM syphilis scrn (repeated)  RPR         3rd TM syphilis scrn (repeated) FTA        GTT Fasting  75 mg/dL 65 - 94 03/15/24 0912    GTT 1 Hr  109 mg/dL 65 - 179 03/15/24 1011    GTT 2 Hr  82 mg/dL 65 - 154 03/15/24 1115    GTT 3 Hr  69 mg/dL 65 - 139 03/15/24 1226    Group B Strep  Streptococcus agalactiae (Group B)   05/30/24 1659              Other testing        Test Value Reference Range Date Time    [place] IgG         CMV IgG                   Drug Screening       Test Value Reference Range Date Time    Amphetamine Screen  Negative ng/mL Mlhstc=9876 12/04/23 0928    Barbiturate Screen  Negative ng/mL Blepoo=528 12/04/23 0928    Benzodiazepine Screen  Negative ng/mL Kookzb=444 12/04/23 0928    Methadone Screen  Negative ng/mL Qdektt=989 12/04/23 0928    Phencyclidine Screen  Negative ng/mL Cutoff=25 12/04/23 0928    Opiates Screen  Negative ng/mL Aflhir=769 12/04/23 0928    THC Screen  Positive  Cutoff=50 12/04/23 0928    Cocaine Screen  Negative ng/mL Ieejns=093 12/04/23 0928    Propoxyphene Screen  Negative ng/mL Ofofdu=785 12/04/23 0928    Buprenorphine Screen        Methamphetamine Screen        Oxycodone Screen        Tricyclic Antidepressants Screen                  Legend    ^: Historical                          External Prenatal Results       Pregnancy Outside Results - Transcribed From Office Records - See Scanned Records For Details       Test  Value Date Time    ABO  A  12/04/23 1020    Rh  Positive  12/04/23 1020    Antibody Screen  Negative  12/04/23 1020    Varicella IgG       Rubella  3.72 index 12/04/23 1020    Hgb  12.2 g/dL 06/18/24 1407       10.9 g/dL 03/05/24 0932       12.1 g/dL 12/04/23 1020    Hct  36.9 % 06/18/24 1407       34.5 % 03/05/24 0932       36.0 % 12/04/23 1020    HgB A1c        1h GTT  135 mg/dL 03/05/24 0932    3h GTT Fasting  75 mg/dL 03/15/24 0912    3h GTT 1 hour  109 mg/dL 03/15/24 1011    3h GTT 2 hour  82 mg/dL 03/15/24 1115    3h GTT 3 hour   69 mg/dL 03/15/24 1226    Gonorrhea (discrete)  Not Detected  06/06/24 0945       Negative  01/08/24 0941       Negative  12/04/23 1102    Chlamydia (discrete)  Not Detected  06/06/24 0945       Negative  01/08/24 0941       Positive  12/04/23 1102    RPR  Non Reactive  12/04/23 1020    Syphilis Antibody       HBsAg  Negative  12/04/23 1020    Herpes Simplex Virus PCR       Herpes Simplex VIrus Culture       HIV  Non Reactive  12/04/23 1020    Hep C RNA Quant PCR       Hep C Antibody  Non Reactive  12/04/23 1020    AFP  47.2 ng/mL 01/08/24 0944    NIPT       Cystic Fibroisis        Group B Strep  Streptococcus agalactiae (Group B)  05/30/24 1659    GBS Susceptibility to Clindamycin       GBS Susceptibility to Erythromycin       Fetal Fibronectin       Genetic Testing, Maternal Blood                 Drug Screening       Test Value Date Time    Urine Drug Screen       Amphetamine Screen  Negative ng/mL 12/04/23 0928    Barbiturate Screen  Negative ng/mL 12/04/23 0928    Benzodiazepine Screen  Negative ng/mL 12/04/23 0928    Methadone Screen  Negative ng/mL 12/04/23 0928    Phencyclidine Screen  Negative ng/mL 12/04/23 0928    Opiates Screen       THC Screen       Cocaine Screen       Propoxyphene Screen  Negative ng/mL 12/04/23 0928    Buprenorphine Screen       Methamphetamine Screen       Oxycodone Screen       Tricyclic Antidepressants Screen                 Legend    ^:  Historical                             Problem List:     Patient Active Problem List   Diagnosis    Chlamydia infection during pregnancy    Trichomonal vaginitis    Supervision of normal first pregnancy, antepartum    Normal labor        Past OB History:     OB History    Para Term  AB Living   1 0 0 0 0 0   SAB IAB Ectopic Molar Multiple Live Births   0 0 0 0 0 0      # Outcome Date GA Lbr Logan/2nd Weight Sex Type Anes PTL Lv   1 Current                Past Medical History: Past Medical History:   Diagnosis Date    Chlamydia     Trichomonal vaginitis 2023      Past Surgical History No past surgical history on file.   Family History: Family History   Problem Relation Age of Onset    Cystic fibrosis Mother     Alcohol abuse Neg Hx     Asthma Neg Hx     Birth defects Neg Hx     Bleeding Disorder Neg Hx     Depression Neg Hx     Diabetes Neg Hx     Drug abuse Neg Hx     Early death Neg Hx     Hearing loss Neg Hx     Heart disease Neg Hx     Hyperlipidemia Neg Hx     Hypertension Neg Hx     Kidney disease Neg Hx     Learning disabilities Neg Hx     Malig Hyperthermia Neg Hx     Mental illness Neg Hx     Mental retardation Neg Hx     Miscarriages / Stillbirths Neg Hx       Social History:  reports that she quit smoking about 4 months ago. Her smoking use included cigars. She has never used smokeless tobacco.   reports no history of alcohol use.   reports that she does not currently use drugs after having used the following drugs: Marijuana.        General ROS: Pertinent items are noted in HPI        Home Medications:  WesTab Plus, famotidine, and ondansetron ODT    Allergies:  No Known Allergies    Objective       Vital Signs Range for the last 24 hours  Temperature:     Temp Source:     BP: BP: (138-156)/() 152/71   Pulse: Heart Rate:  [72-91] 72   Respirations:     SPO2:       Physical Examination:   General appearance - alert, well appearing, and in no distress  Mental status - alert, oriented  to person, place, and time  Abdomen - soft, nontender, nondistended,   Pelvic - normal external genitalia, vulva, vagina, cervix, and uterus   Back exam - full range of motion, no tenderness or pain on motion  Neurological - alert, oriented, normal speech  Extremities - peripheral pulses normal  Skin - normal coloration and turgor, no suspicious skin lesions noted    Presentation: Vtx;  50/0-1/-2 per Dr. Wylie   Cervix:     Dilation:     Effacement:     Station:         Fetal Heart Rate Assessment :  130s, R, GLTV, cat 1  Method:     Beats/min:     Baseline:     Variability:     Accels:     Decels:     Tracing Category:       Uterine Assessment :  irreg  Method:     Frequency (min):     Ctx Count in 10 min:     Duration:     Intensity:     [place] Units:       Lab Results (last 24 hours)       Procedure Component Value Units Date/Time    POC Urinalysis Dipstick [[id number]] Collected: 06/18/24 1314    Specimen: Urine Updated: 06/18/24 1316     Glucose, UA Negative mg/dL      Protein, POC Negative mg/dL     Protein / Creatinine Ratio, Urine - Urine, Clean Catch [[id number]] Collected: 06/18/24 1407    Specimen: Urine, Clean Catch Updated: 06/18/24 1532     Creatinine, Urine 59.1 mg/dL      Total Protein, Urine 12.8 mg/dL      Protein/Creatinine Ratio, Urine 0.22    CBC (No Diff) [[id number]]  (Abnormal) Collected: 06/18/24 1407    Specimen: Blood Updated: 06/18/24 1425     WBC 8.59 10*3/mm3      RBC 4.39 10*6/mm3      Hemoglobin 12.2 g/dL      Hematocrit 36.9 %      MCV 84.1 fL      MCH 27.8 pg      MCHC 33.1 g/dL      RDW 15.6 %      RDW-SD 48.0 fl      MPV 11.5 fL      Platelets 263 10*3/mm3     Comprehensive Metabolic Panel [[id number]]  (Abnormal) Collected: 06/18/24 1407    Specimen: Blood Updated: 06/18/24 1451     Glucose 80 mg/dL      BUN 5 mg/dL      Creatinine 0.56 mg/dL      Sodium 134 mmol/L      Potassium 4.0 mmol/L      Chloride 103 mmol/L      CO2 18.9 mmol/L      Calcium 8.6 mg/dL      Total Protein  6.4 g/dL      Albumin 3.3 g/dL      ALT (SGPT) 15 U/L      AST (SGOT) 19 U/L      Alkaline Phosphatase 182 U/L      Total Bilirubin 0.2 mg/dL      Globulin 3.1 gm/dL      A/G Ratio 1.1 g/dL      BUN/Creatinine Ratio 8.9     Anion Gap 12.1 mmol/L      eGFR 131.7 mL/min/1.73     Narrative:      GFR Normal >60  Chronic Kidney Disease <60  Kidney Failure <15               GBS is positive     Assessment & Plan     GHTN    Assessment:  1.  Intrauterine pregnancy at 39w3d gestation with reactive fetal status.    2.  induction of labor  for GHTN  with unfavorable cervix  3.  Obstetrical history significant for is non-contributory.  4.  GBS status:   Group B Strep Culture   Date Value Ref Range Status   05/30/2024 Streptococcus agalactiae (Group B) (A)  Final     Comment:       This organism is considered to be universally susceptible to penicillin.  No further antibiotic testing will be performed. If Clindamycin or Erythromycin is the drug of choice, notify the laboratory within 7 days to request susceptibility testing.       Plan:  1. cervical ripening with  Misoprostol and antibiotic for GBS  2.  Plan of care has been reviewed with patient and patient agrees.   3.  Risks, benefits of treatment plan have been discussed.  4.  All questions have been answered.        Electronically signed by Faye Blanco MD, 06/18/24, 4:13 PM EDT.

## 2024-06-19 ENCOUNTER — ANESTHESIA EVENT (OUTPATIENT)
Dept: LABOR AND DELIVERY | Facility: HOSPITAL | Age: 24
End: 2024-06-19
Payer: MEDICAID

## 2024-06-19 ENCOUNTER — ANESTHESIA (OUTPATIENT)
Dept: LABOR AND DELIVERY | Facility: HOSPITAL | Age: 24
End: 2024-06-19
Payer: MEDICAID

## 2024-06-19 PROBLEM — O13.3 GESTATIONAL HTN W/O SIGNIFICANT PROTEINURIA, THIRD TRIMESTER: Status: ACTIVE | Noted: 2024-06-19

## 2024-06-19 LAB
ATMOSPHERIC PRESS: 750.1 MMHG
ATMOSPHERIC PRESS: 751.1 MMHG
BASE EXCESS BLDCOA CALC-SCNC: -4.4 MMOL/L (ref -2–2)
BASE EXCESS BLDCOV CALC-SCNC: -5.7 MMOL/L (ref -30–30)
HCO3 BLDCOA-SCNC: 22.1 MMOL/L
HCO3 BLDCOV-SCNC: 20.6 MMOL/L
PCO2 BLDCOA: 44.9 MMHG (ref 33–49)
PCO2 BLDCOV: 42.5 MM HG (ref 35–51.3)
PH BLDCOA: 7.3 PH UNITS (ref 7.18–7.34)
PH BLDCOV: 7.29 PH UNITS (ref 7.26–7.4)
PO2 BLDCOA: 33.6 MMHG
PO2 BLDCOV: 37.1 MM HG (ref 19–39)
SAO2 % BLDCOA: 58.1 %
SAO2 % BLDCOV: 64.5 %

## 2024-06-19 PROCEDURE — C1755 CATHETER, INTRASPINAL: HCPCS | Performed by: NURSE ANESTHETIST, CERTIFIED REGISTERED

## 2024-06-19 PROCEDURE — 25010000002 ONDANSETRON PER 1 MG: Performed by: OBSTETRICS & GYNECOLOGY

## 2024-06-19 PROCEDURE — 59409 OBSTETRICAL CARE: CPT | Performed by: OBSTETRICS & GYNECOLOGY

## 2024-06-19 PROCEDURE — 25010000002 BUPIVACAINE (PF) 0.25 % SOLUTION: Performed by: NURSE ANESTHETIST, CERTIFIED REGISTERED

## 2024-06-19 PROCEDURE — 25010000002 FENTANYL CITRATE (PF) 50 MCG/ML SOLUTION: Performed by: NURSE ANESTHETIST, CERTIFIED REGISTERED

## 2024-06-19 PROCEDURE — 82803 BLOOD GASES ANY COMBINATION: CPT | Performed by: OBSTETRICS & GYNECOLOGY

## 2024-06-19 PROCEDURE — 25010000002 LABETALOL 5 MG/ML SOLUTION: Performed by: OBSTETRICS & GYNECOLOGY

## 2024-06-19 PROCEDURE — 88307 TISSUE EXAM BY PATHOLOGIST: CPT | Performed by: OBSTETRICS & GYNECOLOGY

## 2024-06-19 PROCEDURE — 51702 INSERT TEMP BLADDER CATH: CPT

## 2024-06-19 PROCEDURE — 25010000002 HYDROMORPHONE 1 MG/ML SOLUTION: Performed by: OBSTETRICS & GYNECOLOGY

## 2024-06-19 PROCEDURE — 0HQ9XZZ REPAIR PERINEUM SKIN, EXTERNAL APPROACH: ICD-10-PCS | Performed by: OBSTETRICS & GYNECOLOGY

## 2024-06-19 PROCEDURE — G0463 HOSPITAL OUTPT CLINIC VISIT: HCPCS

## 2024-06-19 PROCEDURE — 25010000002 PENICILLIN G POTASSIUM PER 600000 UNITS: Performed by: OBSTETRICS & GYNECOLOGY

## 2024-06-19 RX ORDER — OXYTOCIN/0.9 % SODIUM CHLORIDE 30/500 ML
999 PLASTIC BAG, INJECTION (ML) INTRAVENOUS ONCE
Status: COMPLETED | OUTPATIENT
Start: 2024-06-19 | End: 2024-06-19

## 2024-06-19 RX ORDER — ONDANSETRON 2 MG/ML
4 INJECTION INTRAMUSCULAR; INTRAVENOUS EVERY 6 HOURS PRN
Status: DISCONTINUED | OUTPATIENT
Start: 2024-06-19 | End: 2024-06-21 | Stop reason: HOSPADM

## 2024-06-19 RX ORDER — FAMOTIDINE 20 MG/1
20 TABLET, FILM COATED ORAL ONCE AS NEEDED
Status: DISCONTINUED | OUTPATIENT
Start: 2024-06-19 | End: 2024-06-21 | Stop reason: HOSPADM

## 2024-06-19 RX ORDER — SODIUM CHLORIDE 0.9 % (FLUSH) 0.9 %
1-10 SYRINGE (ML) INJECTION AS NEEDED
Status: DISCONTINUED | OUTPATIENT
Start: 2024-06-19 | End: 2024-06-21 | Stop reason: HOSPADM

## 2024-06-19 RX ORDER — ONDANSETRON 4 MG/1
4 TABLET, ORALLY DISINTEGRATING ORAL EVERY 6 HOURS PRN
Status: DISCONTINUED | OUTPATIENT
Start: 2024-06-19 | End: 2024-06-21 | Stop reason: HOSPADM

## 2024-06-19 RX ORDER — VITAMIN A, VITAMIN C, VITAMIN D, VITAMIN E, THIAMINE, RIBOFLAVIN, NIACIN, VITAMIN B6, FOLIC ACID, VITAMIN B12, CALCIUM, IRON, ZINC, COPPER 4000; 120; 400; 22; 1.84; 3; 20; 10; 1; 12; 200; 27; 25; 2 [IU]/1; MG/1; [IU]/1; [IU]/1; MG/1; MG/1; MG/1; MG/1; MG/1; UG/1; MG/1; MG/1; MG/1; MG/1
1 TABLET ORAL DAILY
Status: DISCONTINUED | OUTPATIENT
Start: 2024-06-19 | End: 2024-06-21 | Stop reason: HOSPADM

## 2024-06-19 RX ORDER — FENTANYL CITRATE 50 UG/ML
INJECTION, SOLUTION INTRAMUSCULAR; INTRAVENOUS
Status: COMPLETED | OUTPATIENT
Start: 2024-06-19 | End: 2024-06-19

## 2024-06-19 RX ORDER — IBUPROFEN 600 MG/1
600 TABLET ORAL EVERY 6 HOURS PRN
Status: DISCONTINUED | OUTPATIENT
Start: 2024-06-19 | End: 2024-06-21 | Stop reason: HOSPADM

## 2024-06-19 RX ORDER — NIFEDIPINE 10 MG/1
10-20 CAPSULE ORAL
Status: DISCONTINUED | OUTPATIENT
Start: 2024-06-19 | End: 2024-06-19

## 2024-06-19 RX ORDER — OXYTOCIN/0.9 % SODIUM CHLORIDE 30/500 ML
125 PLASTIC BAG, INJECTION (ML) INTRAVENOUS ONCE AS NEEDED
Status: DISCONTINUED | OUTPATIENT
Start: 2024-06-19 | End: 2024-06-21 | Stop reason: HOSPADM

## 2024-06-19 RX ORDER — HYDROCODONE BITARTRATE AND ACETAMINOPHEN 10; 325 MG/1; MG/1
1 TABLET ORAL EVERY 4 HOURS PRN
Status: DISCONTINUED | OUTPATIENT
Start: 2024-06-19 | End: 2024-06-21 | Stop reason: HOSPADM

## 2024-06-19 RX ORDER — ACETAMINOPHEN 325 MG/1
650 TABLET ORAL EVERY 6 HOURS PRN
Status: DISCONTINUED | OUTPATIENT
Start: 2024-06-19 | End: 2024-06-21 | Stop reason: HOSPADM

## 2024-06-19 RX ORDER — BUPIVACAINE HYDROCHLORIDE 2.5 MG/ML
INJECTION, SOLUTION EPIDURAL; INFILTRATION; INTRACAUDAL
Status: COMPLETED
Start: 2024-06-19 | End: 2024-06-19

## 2024-06-19 RX ORDER — PROMETHAZINE HYDROCHLORIDE 25 MG/1
25 TABLET ORAL EVERY 6 HOURS PRN
Status: DISCONTINUED | OUTPATIENT
Start: 2024-06-19 | End: 2024-06-21 | Stop reason: HOSPADM

## 2024-06-19 RX ORDER — FENTANYL CITRATE 50 UG/ML
INJECTION, SOLUTION INTRAMUSCULAR; INTRAVENOUS
Status: COMPLETED
Start: 2024-06-19 | End: 2024-06-19

## 2024-06-19 RX ORDER — HYDROCODONE BITARTRATE AND ACETAMINOPHEN 5; 325 MG/1; MG/1
1 TABLET ORAL EVERY 4 HOURS PRN
Status: DISCONTINUED | OUTPATIENT
Start: 2024-06-19 | End: 2024-06-21 | Stop reason: HOSPADM

## 2024-06-19 RX ORDER — BUPIVACAINE HYDROCHLORIDE 2.5 MG/ML
INJECTION, SOLUTION EPIDURAL; INFILTRATION; INTRACAUDAL AS NEEDED
Status: DISCONTINUED | OUTPATIENT
Start: 2024-06-19 | End: 2024-06-19 | Stop reason: SURG

## 2024-06-19 RX ORDER — EPHEDRINE SULFATE 50 MG/ML
5 INJECTION, SOLUTION INTRAVENOUS
Status: DISCONTINUED | OUTPATIENT
Start: 2024-06-19 | End: 2024-06-19 | Stop reason: HOSPADM

## 2024-06-19 RX ORDER — LIDOCAINE HYDROCHLORIDE AND EPINEPHRINE 15; 5 MG/ML; UG/ML
INJECTION, SOLUTION EPIDURAL
Status: COMPLETED | OUTPATIENT
Start: 2024-06-19 | End: 2024-06-19

## 2024-06-19 RX ORDER — OXYTOCIN/0.9 % SODIUM CHLORIDE 30/500 ML
999 PLASTIC BAG, INJECTION (ML) INTRAVENOUS ONCE
Status: DISCONTINUED | OUTPATIENT
Start: 2024-06-19 | End: 2024-06-21 | Stop reason: HOSPADM

## 2024-06-19 RX ORDER — CALCIUM CARBONATE 500 MG/1
2 TABLET, CHEWABLE ORAL 3 TIMES DAILY PRN
Status: DISCONTINUED | OUTPATIENT
Start: 2024-06-19 | End: 2024-06-21 | Stop reason: HOSPADM

## 2024-06-19 RX ORDER — ONDANSETRON 2 MG/ML
4 INJECTION INTRAMUSCULAR; INTRAVENOUS EVERY 6 HOURS PRN
Status: DISCONTINUED | OUTPATIENT
Start: 2024-06-19 | End: 2024-06-19 | Stop reason: HOSPADM

## 2024-06-19 RX ORDER — DOCUSATE SODIUM 100 MG/1
100 CAPSULE, LIQUID FILLED ORAL 2 TIMES DAILY
Status: DISCONTINUED | OUTPATIENT
Start: 2024-06-19 | End: 2024-06-21 | Stop reason: HOSPADM

## 2024-06-19 RX ORDER — LABETALOL HYDROCHLORIDE 5 MG/ML
20-80 INJECTION, SOLUTION INTRAVENOUS
Status: DISCONTINUED | OUTPATIENT
Start: 2024-06-19 | End: 2024-06-19

## 2024-06-19 RX ORDER — OXYTOCIN/0.9 % SODIUM CHLORIDE 30/500 ML
250 PLASTIC BAG, INJECTION (ML) INTRAVENOUS CONTINUOUS
Status: ACTIVE | OUTPATIENT
Start: 2024-06-19 | End: 2024-06-19

## 2024-06-19 RX ORDER — FAMOTIDINE 10 MG/ML
20 INJECTION, SOLUTION INTRAVENOUS ONCE AS NEEDED
Status: DISCONTINUED | OUTPATIENT
Start: 2024-06-19 | End: 2024-06-21 | Stop reason: HOSPADM

## 2024-06-19 RX ORDER — IBUPROFEN 800 MG/1
800 TABLET ORAL ONCE AS NEEDED
Status: COMPLETED | OUTPATIENT
Start: 2024-06-19 | End: 2024-06-19

## 2024-06-19 RX ORDER — HYDRALAZINE HYDROCHLORIDE 20 MG/ML
5-10 INJECTION INTRAMUSCULAR; INTRAVENOUS
Status: DISCONTINUED | OUTPATIENT
Start: 2024-06-19 | End: 2024-06-19

## 2024-06-19 RX ORDER — IBUPROFEN 600 MG/1
600 TABLET ORAL EVERY 6 HOURS PRN
Status: DISCONTINUED | OUTPATIENT
Start: 2024-06-19 | End: 2024-06-19 | Stop reason: HOSPADM

## 2024-06-19 RX ORDER — ACETAMINOPHEN 325 MG/1
650 TABLET ORAL ONCE AS NEEDED
Status: DISCONTINUED | OUTPATIENT
Start: 2024-06-19 | End: 2024-06-21 | Stop reason: HOSPADM

## 2024-06-19 RX ORDER — ONDANSETRON 4 MG/1
4 TABLET, ORALLY DISINTEGRATING ORAL EVERY 6 HOURS PRN
Status: DISCONTINUED | OUTPATIENT
Start: 2024-06-19 | End: 2024-06-19 | Stop reason: HOSPADM

## 2024-06-19 RX ADMIN — PENICILLIN G POTASSIUM 2.5 MILLION UNITS: 5000000 INJECTION, POWDER, FOR SOLUTION INTRAMUSCULAR; INTRAVENOUS at 04:40

## 2024-06-19 RX ADMIN — Medication 999 ML/HR: at 08:15

## 2024-06-19 RX ADMIN — Medication 250 ML/HR: at 09:30

## 2024-06-19 RX ADMIN — HYDROMORPHONE HYDROCHLORIDE 0.5 MG: 1 INJECTION, SOLUTION INTRAMUSCULAR; INTRAVENOUS; SUBCUTANEOUS at 03:59

## 2024-06-19 RX ADMIN — Medication 10 ML/HR: at 06:40

## 2024-06-19 RX ADMIN — EPHEDRINE SULFATE 5 MG: 50 INJECTION INTRAVENOUS at 07:05

## 2024-06-19 RX ADMIN — ONDANSETRON 4 MG: 2 INJECTION INTRAMUSCULAR; INTRAVENOUS at 07:18

## 2024-06-19 RX ADMIN — LABETALOL HYDROCHLORIDE 20 MG: 5 INJECTION, SOLUTION INTRAVENOUS at 01:22

## 2024-06-19 RX ADMIN — ONDANSETRON 4 MG: 2 INJECTION INTRAMUSCULAR; INTRAVENOUS at 00:47

## 2024-06-19 RX ADMIN — IBUPROFEN 800 MG: 800 TABLET, FILM COATED ORAL at 20:25

## 2024-06-19 RX ADMIN — FENTANYL CITRATE 100 MCG: 50 INJECTION, SOLUTION INTRAMUSCULAR; INTRAVENOUS at 06:40

## 2024-06-19 RX ADMIN — LIDOCAINE HYDROCHLORIDE AND EPINEPHRINE 2 ML: 15; 5 INJECTION, SOLUTION EPIDURAL at 06:40

## 2024-06-19 RX ADMIN — MISOPROSTOL 800 MCG: 200 TABLET ORAL at 08:15

## 2024-06-19 RX ADMIN — LIDOCAINE HYDROCHLORIDE AND EPINEPHRINE 3 ML: 15; 5 INJECTION, SOLUTION EPIDURAL at 06:38

## 2024-06-19 RX ADMIN — LABETALOL HYDROCHLORIDE 40 MG: 5 INJECTION, SOLUTION INTRAVENOUS at 01:42

## 2024-06-19 RX ADMIN — PENICILLIN G POTASSIUM 2.5 MILLION UNITS: 5000000 INJECTION, POWDER, FOR SOLUTION INTRAMUSCULAR; INTRAVENOUS at 00:47

## 2024-06-19 RX ADMIN — BUPIVACAINE HYDROCHLORIDE 3 ML: 2.5 INJECTION, SOLUTION EPIDURAL; INFILTRATION; INTRACAUDAL; PERINEURAL at 06:40

## 2024-06-19 RX ADMIN — HYDROMORPHONE HYDROCHLORIDE 0.5 MG: 1 INJECTION, SOLUTION INTRAMUSCULAR; INTRAVENOUS; SUBCUTANEOUS at 01:47

## 2024-06-19 NOTE — PLAN OF CARE
Problem: Adult Inpatient Plan of Care  Goal: Plan of Care Review  Outcome: Ongoing, Progressing  Goal: Patient-Specific Goal (Individualized)  Outcome: Ongoing, Progressing  Goal: Absence of Hospital-Acquired Illness or Injury  Outcome: Ongoing, Progressing  Intervention: Identify and Manage Fall Risk  Recent Flowsheet Documentation  Taken 6/18/2024 1935 by Nena Navas RN  Safety Promotion/Fall Prevention: safety round/check completed  Intervention: Prevent and Manage VTE (Venous Thromboembolism) Risk  Recent Flowsheet Documentation  Taken 6/18/2024 1935 by Nena Navas RN  VTE Prevention/Management:   bilateral   sequential compression devices on  Range of Motion: active ROM (range of motion) encouraged  Taken 6/18/2024 1918 by Nena Navas RN  VTE Prevention/Management:   bilateral   sequential compression devices on  Intervention: Prevent Infection  Recent Flowsheet Documentation  Taken 6/18/2024 1935 by Nena Navas RN  Infection Prevention:   visitors restricted/screened   single patient room provided   rest/sleep promoted   personal protective equipment utilized   hand hygiene promoted   equipment surfaces disinfected   environmental surveillance performed   cohorting utilized  Goal: Optimal Comfort and Wellbeing  Outcome: Ongoing, Progressing  Intervention: Provide Person-Centered Care  Recent Flowsheet Documentation  Taken 6/18/2024 1935 by Nena Navas RN  Trust Relationship/Rapport:   care explained   choices provided   emotional support provided   empathic listening provided   questions answered   questions encouraged   reassurance provided   thoughts/feelings acknowledged  Goal: Readiness for Transition of Care  Outcome: Ongoing, Progressing     Problem: Infection (Labor)  Goal: Absence of Infection Signs and Symptoms  Outcome: Ongoing, Progressing  Intervention: Prevent or Manage Infection  Recent Flowsheet Documentation  Taken 6/18/2024 1935 by Nena Navas RN  Infection  Prevention:   visitors restricted/screened   single patient room provided   rest/sleep promoted   personal protective equipment utilized   hand hygiene promoted   equipment surfaces disinfected   environmental surveillance performed   cohorting utilized     Problem: Bleeding (Labor)  Goal: Hemostasis  Outcome: Ongoing, Progressing     Problem: Change in Fetal Wellbeing (Labor)  Goal: Stable Fetal Wellbeing  Outcome: Ongoing, Progressing     Problem: Delayed Labor Progression (Labor)  Goal: Effective Progression to Delivery  Outcome: Ongoing, Progressing     Problem: Labor Pain (Labor)  Goal: Acceptable Pain Control  Outcome: Ongoing, Progressing  Intervention: Support Labor Pain Coping and Management  Recent Flowsheet Documentation  Taken 6/19/2024 0357 by Nena Navas RN  Pain Management Interventions: (Dilaudid 0.5mg administered IV) other (see comments)  Taken 6/19/2024 0147 by Nena Navas RN  Pain Management Interventions: (Dilaudid 0.5 mg administered IV) other (see comments)  Taken 6/19/2024 0050 by Nena Navas RN  Pain Management Interventions: no interventions per patient request  Taken 6/18/2024 2100 by Nena Navas RN  Pain Management Interventions: no interventions per patient request     Problem: Uterine Tachysystole (Labor)  Goal: Normal Uterine Contraction Pattern  Outcome: Ongoing, Progressing   Goal Outcome Evaluation:

## 2024-06-19 NOTE — ANESTHESIA PREPROCEDURE EVALUATION
Anesthesia Evaluation     NPO Solid Status: > 8 hours  NPO Liquid Status: > 2 hours           Airway   Mallampati: II  TM distance: >3 FB  Neck ROM: full  No difficulty expected  Dental - normal exam     Pulmonary - negative pulmonary ROS and normal exam   Cardiovascular - normal exam  Exercise tolerance: good (4-7 METS)        Neuro/Psych  GI/Hepatic/Renal/Endo      Musculoskeletal (-) negative ROS    Abdominal    Substance History - negative use     OB/GYN    (+) Pregnant, pregnancy induced hypertension        Other                    Anesthesia Plan    ASA 2     epidural       Anesthetic plan, risks, benefits, and alternatives have been provided, discussed and informed consent has been obtained with: patient.    CODE STATUS:    Level Of Support Discussed With: Patient  Code Status (Patient has no pulse and is not breathing): CPR (Attempt to Resuscitate)  Medical Interventions (Patient has pulse or is breathing): Full

## 2024-06-19 NOTE — L&D DELIVERY NOTE
[facility]  Vaginal Delivery Note    Delivery     Delivery: Vaginal, Spontaneous     YOB: 2024    Time of Birth:  Gestational Age 8:12 AM   39w4d     Anesthesia: Epidural   Delivering clinician: Lucas Clinton MD   Forceps?   No   Vacuum? No    Shoulder dystocia present: No        Delivery narrative:  I was called to the room as the patient was complete complete and +3.  Viable female infant Apgars 7 and 9 was delivered over intact perineum.  Infant's nose and mouth were bulb suctioned the cord was clamped and cut after 30 seconds.  Infant was placed on mom's chest.  Cord gas and cord blood was then obtained.  Placenta delivered a few minutes later spontaneously.  Inspection revealed right vaginal wall laceration and first-degree perineal laceration which were repaired with 3-0 Vicryl suture in interrupted fashion.    Infant    Findings: female  infant     Infant observations: Weight: 3100 g (6 lb 13.4 oz)   Length: 19  in  Observations/Comments:        Apgars: 7  @ 1 minute /    9  @ 5 minutes         Placenta, Cord, and Fluid    Placenta delivered  Spontaneous  at   6/19/2024  8:15 AM     Cord: 3 vessels  present.   Nuchal Cord?  no   Cord blood obtained: Yes    Cord gases obtained:  Yes    Cord gas results: Venous:    pH, Cord Venous   Date Value Ref Range Status   06/19/2024 7.294 7.260 - 7.400 pH Units Final     Base Excess, Cord Venous   Date Value Ref Range Status   06/19/2024 -5.7 -30.0 - 30.0 mmol/L Final     Comment:     Serial Number: [id number]:  [id number]       Arterial:    pH, Cord Arterial   Date Value Ref Range Status   06/19/2024 7.30 7.18 - 7.34 pH Units Final     Base Exc, Cord Arterial   Date Value Ref Range Status   06/19/2024 -4.4 (L) -2.0 - 2.0 mmol/L Final     Comment:     Serial Number: [id number]:  [id number]        Repair    Episiotomy: None    No   Lacerations: Right vaginal wall and first-degree perineal   Estimated Blood Loss: Est. Blood Loss (mL): 350 mL (Filed from  Delivery Summary) (06/19/24 0812)           Complications  none    Disposition  Mother to Mother Baby/Postpartum  in stable condition currently.  Baby to remains with mom  in stable condition currently.      Lucas Clinton MD  06/19/24  08:40 EDT

## 2024-06-19 NOTE — ANESTHESIA PROCEDURE NOTES
Labor Epidural      Patient reassessed immediately prior to procedure    Patient location during procedure: OB  Start Time: 6/19/2024 6:28 AM  Stop Time: 6/19/2024 6:42 AM  Performed By  CRNA/CAA: Abrahan Pierson CRNA  Preanesthetic Checklist  Completed: patient identified, IV checked, risks and benefits discussed, surgical consent, monitors and equipment checked, pre-op evaluation and timeout performed  Prep:  Pt Position:sitting  Sterile Tech:cap, gloves, mask and sterile barrier  Prep:povidone-iodine 7.5% surgical scrub  Monitoring:blood pressure monitoring, continuous pulse oximetry and EKG  Epidural Block Procedure:  Approach:midline  Guidance:landmark technique  Location:L3-L4  Needle Type:Tuohy  Needle Gauge:17 G  Loss of Resistance Medium: saline  Loss of resistance: 15.  Catheter at skin depth (cm): 19.  Paresthesia: none  Aspiration:negative  Test Dose:negative  Medication: fentaNYL citrate (PF) (SUBLIMAZE) injection - Epidural   100 mcg - 6/19/2024 6:40:00 AM  lidocaine 1.5%-EPINEPHrine 1:200,000 (XYLOCAINE W/EPI) injection - Epidural   3 mL - 6/19/2024 6:38:00 AM   2 mL - 6/19/2024 6:40:00 AM  Number of Attempts: 1  Post Assessment:  Dressing:secured with tape and occlusive dressing applied  Pt Tolerance:patient tolerated the procedure well with no apparent complications  Complications:no

## 2024-06-19 NOTE — PROGRESS NOTES
"PHANI [facility]  Obstetric Progress Note    Subjective     Patient:    The patient feels well after getting her epidural.  No headache visual disturbance right upper quadrant pain    Objective     Vital Signs Range for the last 24 hours  Temp:  [97.8 °F (36.6 °C)-98.4 °F (36.9 °C)] 98.1 °F (36.7 °C)   Temp src: Oral   BP: (100-171)/() 100/62   Heart Rate:  [57-98] 80   Resp:  [16-20] 20   SpO2:  [95 %-100 %] 99 %           Weight:  [116 kg (255 lb)] 116 kg (255 lb)       Flowsheet Rows      Flowsheet Row First Filed Value   Admission Height 157.5 cm (62\") Documented at 06/18/2024 1644   Admission Weight --            Intake/Output last 24 hours:      Intake/Output Summary (Last 24 hours) at 6/19/2024 0758  Last data filed at 6/19/2024 0555  Gross per 24 hour   Intake 1913 ml   Output 1825 ml   Net 88 ml       Intake/Output this shift:    No intake/output data recorded.    Physical Exam:  General: Patient is well appearing           Abdomen Abdominal exam: soft, nontender, nondistended, no masses or organomegaly.   Extremities Exam of extremities: peripheral pulses normal, no pedal edema, no clubbing or cyanosis     Presentation: Vertex   Cervix: Exam by: Method: sterile exam per RN   Dilation: Complete   Effacement: 100%   Station: +2         Fetal Heart Rate Assessment   Method: Fetal HR Assessment Method: external   Beats/min: Fetal HR (beats/min): 130   Baseline: Fetal HR Baseline: normal range   Variability: Fetal HR Variability: minimal (detectable, amplitude less than or equal to 5 bpm)   Accels: Fetal HR Accelerations: absent   Decels: Fetal HR Decelerations: variable   Tracing Category:       Uterine Assessment   Method: Method: palpation, external tocotransducer   Frequency (min): Contraction Frequency (Minutes): 4-5 (difficulty tracing due to maternal positioning, ambulating to bathroom)   Ctx Count in 10 min:     Duration:     Intensity: Contraction Intensity: strong by palpation   Intensity by IUPC:   "   Resting Tone: Uterine Resting Tone: soft by palpation   Resting Tone by IUPC:     [place] Units:         Assessment & Plan     Induction for gestational hypertension      Assessment:  1.  Intrauterine pregnancy at 39w4d gestation with reactive fetal status.    2.  Gestational hypertension  3.  Obstetrical history significant for is non-contributory.  4.  GBS status:   Group B Strep Culture   Date Value Ref Range Status   05/30/2024 Streptococcus agalactiae (Group B) (A)  Final     Comment:       This organism is considered to be universally susceptible to penicillin.  No further antibiotic testing will be performed. If Clindamycin or Erythromycin is the drug of choice, notify the laboratory within 7 days to request susceptibility testing.       Plan:  1. Vaginal anticipated  2. Plan of care has been reviewed with patient and she agrees  3.  Risks, benefits of treatment plan have been discussed.  4.  All questions have been answered.        Lucas Clinton MD  6/19/2024  07:58 EDT

## 2024-06-19 NOTE — LACTATION NOTE
LC in to see this P1 patient shortly after delivery. Baby was irritable and would not latch. LC coached mother how to initiate  a feeding but baby would start crying again. Mother requested formula after about 30 minutes of trying to get a latch. LC did try alternate positions and enticement with formula and a nipple shield and baby did not respond positively to these interventions.

## 2024-06-19 NOTE — PAYOR COMM NOTE
AUTHORIZATION REQUEST for EMERGENCY DEPARTMENT ADMISSION        PATIENT INFORMATION  Name:             Macey León  MRN#:            [id number]        ADMISSION INFORMATION  CLASS:          Inpatient   DOS:               6/18/2024        ADMISSION DIAGNOSIS AND HOSPITAL PROBLEMS    Problems Addressed this Visit          Other    Supervision of normal first pregnancy, antepartum    Relevant Medications    acetaminophen (TYLENOL) tablet 650 mg    ibuprofen (ADVIL,MOTRIN) tablet 800 mg    HYDROcodone-acetaminophen (NORCO) 5-325 MG per tablet 1 tablet    HYDROcodone-acetaminophen (NORCO)  MG per tablet 1 tablet    ondansetron ODT (ZOFRAN-ODT) disintegrating tablet 4 mg    ondansetron (ZOFRAN) injection 4 mg    promethazine (PHENERGAN) tablet 25 mg    famotidine (PEPCID) injection 20 mg    famotidine (PEPCID) tablet 20 mg    oxytocin (PITOCIN) 30 units in 0.9% sodium chloride 500 mL (premix)     Diagnoses         Codes Comments    Supervision of normal first pregnancy, antepartum     ICD-10-CM: Z34.00  ICD-9-CM: V22.0          VAG DELIVERY O80        ADMITTING PROVIDER INFORMATION  Name/NPI       Lucas Clinton MD [[id number]]  Phone:            Phone: [phone]        RENDERING FACILITY  Name:             [facility]   NPI:                 [id number]  TID:                 [id number]  Address:        [address] [place], [place] [address] [address]        CASE MANAGEMENT CONTACT INFORMATION  Name:             BEATRIZ Pineda  Phone:            [phone]  Fax:                [phone]      Date of Birth   2000    Social Security Number       Address   [address] [place] [address]    Home Phone   [phone]    MRN   [id number]       Amish   None    Marital Status   Single                            Admission Date   6/18/24    Admission Type   Elective    Admitting Provider   Faye Blanco MD    Attending Provider   Lucas Clinton MD    Department, Room/Bed   [facility]  "MARIA G NARVAEZ MOTHER BABY, M432/1       Discharge Date       Discharge Disposition       Discharge Destination                                 Attending Provider: Lucas Clinton MD    Allergies: No Known Allergies    Isolation: None   Infection: None   Code Status: CPR    Ht: 157.5 cm (62\")   Wt: 116 kg (255 lb)    Admission Cmt: None   Principal Problem: Gestational htn w/o significant proteinuria, third trimester [O13.3]                   Active Insurance as of 2024       Primary Coverage       Payor Plan Insurance Group Employer/Plan Group    ANTHEM MEDICAID ANTHEM MEDICAID [id number]       Payor Plan Address Payor Plan Phone Number Payor Plan Fax Number Effective Dates    [address] [address] [phone]  2023 - None Entered    [facility] [id number]         Subscriber Name Subscriber Birth Date Member ID       MOHAN TRAN 2000 [id number]                     Emergency Contacts        (Rel.) Home Phone Work Phone Mobile Phone    EVERT ABBASI (Mother) -- -- [phone]                 History & Physical        Faye Blanco MD at 24 1612           Jatin  Obstetric History and Physical    Chief Complaint   Patient presents with    Elevated Blood Pressure       Subjective    PNC provided by:  [facility]    HPI:    Patient is a 23 y.o. female  currently at 39w3d, who presents with elevated BP in office;  pt feels good and denies HA, vision changes or RUQ pain.    Her prenatal care is complicated by  sexually transmitted disease  chlamydia and trichomoniasis.  Her previous obstetric/gynecological history is noted for is non-contributory.    The following portions of the patients history were reviewed and updated as appropriate:   current medications, allergies, past medical history, past surgical history, past family history, past social history and current problem list.     Prenatal Information:  Prenatal Results       Initial Prenatal Labs       Test Value " Reference Range Date Time    Hemoglobin  12.1 g/dL 11.1 - 15.9 12/04/23 1020    Hematocrit  36.0 % 34.0 - 46.6 12/04/23 1020    Platelets  274 x10E3/uL 150 - 450 12/04/23 1020    Rubella IgG  3.72 index Immune >0.99 12/04/23 1020    Hepatitis B SAg  Negative  Negative 12/04/23 1020    Hepatitis C Ab  Non Reactive  Non Reactive 12/04/23 1020    RPR  Non Reactive  Non Reactive 12/04/23 1020    T. Pallidum Ab         ABO  A   12/04/23 1020    Rh  Positive   12/04/23 1020    Antibody Screen  Negative  Negative 12/04/23 1020    HIV  Non Reactive  Non Reactive 12/04/23 1020    Urine Culture  Final report   12/04/23 0928    Gonorrhea  Not Detected  Not Detected  06/06/24 0945       Negative  Negative 01/08/24 0941       Negative  Negative 12/04/23 1102    Chlamydia  Not Detected  Not Detected  06/06/24 0945       Negative  Negative 01/08/24 0941       Positive  Negative 12/04/23 1102    TSH        HgB A1c         Varicella IgG        Hemoglobinopathy Fractionation        Hemoglobinopathy (genetic testing)        Cystic fibrosis                   Fetal testing        Test Value Reference Range Date Time    NIPT        MSAFP  *Screen Negative*   01/08/24 0944    AFP-4                  2nd and 3rd Trimester       Test Value Reference Range Date Time    Hemoglobin (repeated)  12.2 g/dL 12.0 - 15.9 06/18/24 1407       10.9 g/dL 12.0 - 15.9 03/05/24 0932    Hematocrit (repeated)  36.9 % 34.0 - 46.6 06/18/24 1407       34.5 % 34.0 - 46.6 03/05/24 0932    Platelets   263 10*3/mm3 140 - 450 06/18/24 1407       335 10*3/mm3 140 - 450 03/05/24 0932       274 x10E3/uL 150 - 450 12/04/23 1020    1 hour GTT   135 mg/dL 65 - 139 03/05/24 0932    Antibody Screen (repeated)        3rd TM syphilis scrn (repeated)  RPR         3rd TM syphilis scrn (repeated) FTA        GTT Fasting  75 mg/dL 65 - 94 03/15/24 0912    GTT 1 Hr  109 mg/dL 65 - 179 03/15/24 1011    GTT 2 Hr  82 mg/dL 65 - 154 03/15/24 1115    GTT 3 Hr  69 mg/dL 65 - 139 03/15/24  1226    Group B Strep  Streptococcus agalactiae (Group B)   05/30/24 1659              Other testing        Test Value Reference Range Date Time    Parvo IgG         CMV IgG                   Drug Screening       Test Value Reference Range Date Time    Amphetamine Screen  Negative ng/mL Hsxluq=6157 12/04/23 0928    Barbiturate Screen  Negative ng/mL Wduqty=582 12/04/23 0928    Benzodiazepine Screen  Negative ng/mL Mgvkia=015 12/04/23 0928    Methadone Screen  Negative ng/mL Njpdof=029 12/04/23 0928    Phencyclidine Screen  Negative ng/mL Cutoff=25 12/04/23 0928    Opiates Screen  Negative ng/mL Fbsdiv=327 12/04/23 0928    THC Screen  Positive  Cutoff=50 12/04/23 0928    Cocaine Screen  Negative ng/mL Pgszqw=301 12/04/23 0928    Propoxyphene Screen  Negative ng/mL Ticxwr=554 12/04/23 0928    Buprenorphine Screen        Methamphetamine Screen        Oxycodone Screen        Tricyclic Antidepressants Screen                  Legend    ^: Historical                          External Prenatal Results       Pregnancy Outside Results - Transcribed From Office Records - See Scanned Records For Details       Test Value Date Time    ABO  A  12/04/23 1020    Rh  Positive  12/04/23 1020    Antibody Screen  Negative  12/04/23 1020    Varicella IgG       Rubella  3.72 index 12/04/23 1020    Hgb  12.2 g/dL 06/18/24 1407       10.9 g/dL 03/05/24 0932       12.1 g/dL 12/04/23 1020    Hct  36.9 % 06/18/24 1407       34.5 % 03/05/24 0932       36.0 % 12/04/23 1020    HgB A1c        1h GTT  135 mg/dL 03/05/24 0932    3h GTT Fasting  75 mg/dL 03/15/24 0912    3h GTT 1 hour  109 mg/dL 03/15/24 1011    3h GTT 2 hour  82 mg/dL 03/15/24 1115    3h GTT 3 hour   69 mg/dL 03/15/24 1226    Gonorrhea (discrete)  Not Detected  06/06/24 0945       Negative  01/08/24 0941       Negative  12/04/23 1102    Chlamydia (discrete)  Not Detected  06/06/24 0945       Negative  01/08/24 0941       Positive  12/04/23 1102    RPR  Non Reactive  12/04/23 1020     Syphilis Antibody       HBsAg  Negative  23 1020    Herpes Simplex Virus PCR       Herpes Simplex VIrus Culture       HIV  Non Reactive  23 1020    Hep C RNA Quant PCR       Hep C Antibody  Non Reactive  23 1020    AFP  47.2 ng/mL 24 0944    NIPT       Cystic Fibroisis        Group B Strep  Streptococcus agalactiae (Group B)  24 1659    GBS Susceptibility to Clindamycin       GBS Susceptibility to Erythromycin       Fetal Fibronectin       Genetic Testing, Maternal Blood                 Drug Screening       Test Value Date Time    Urine Drug Screen       Amphetamine Screen  Negative ng/mL 23 0928    Barbiturate Screen  Negative ng/mL 23 0928    Benzodiazepine Screen  Negative ng/mL 23 0928    Methadone Screen  Negative ng/mL 23 0928    Phencyclidine Screen  Negative ng/mL 23 0928    Opiates Screen       THC Screen       Cocaine Screen       Propoxyphene Screen  Negative ng/mL 23 0928    Buprenorphine Screen       Methamphetamine Screen       Oxycodone Screen       Tricyclic Antidepressants Screen                 Legend    ^: Historical                             Problem List:     Patient Active Problem List   Diagnosis    Chlamydia infection during pregnancy    Trichomonal vaginitis    Supervision of normal first pregnancy, antepartum    Normal labor        Past OB History:     OB History    Para Term  AB Living   1 0 0 0 0 0   SAB IAB Ectopic Molar Multiple Live Births   0 0 0 0 0 0      # Outcome Date GA Lbr Logan/2nd Weight Sex Type Anes PTL Lv   1 Current                Past Medical History: Past Medical History:   Diagnosis Date    Chlamydia     Trichomonal vaginitis 2023      Past Surgical History No past surgical history on file.   Family History: Family History   Problem Relation Age of Onset    Cystic fibrosis Mother     Alcohol abuse Neg Hx     Asthma Neg Hx     Birth defects Neg Hx     Bleeding Disorder Neg Hx      Depression Neg Hx     Diabetes Neg Hx     Drug abuse Neg Hx     Early death Neg Hx     Hearing loss Neg Hx     Heart disease Neg Hx     Hyperlipidemia Neg Hx     Hypertension Neg Hx     Kidney disease Neg Hx     Learning disabilities Neg Hx     Malig Hyperthermia Neg Hx     Mental illness Neg Hx     Mental retardation Neg Hx     Miscarriages / Stillbirths Neg Hx       Social History:  reports that she quit smoking about 4 months ago. Her smoking use included cigars. She has never used smokeless tobacco.   reports no history of alcohol use.   reports that she does not currently use drugs after having used the following drugs: Marijuana.        General ROS: Pertinent items are noted in HPI        Home Medications:  WesTab Plus, famotidine, and ondansetron ODT    Allergies:  No Known Allergies    Objective      Vital Signs Range for the last 24 hours  Temperature:     Temp Source:     BP: BP: (138-156)/() 152/71   Pulse: Heart Rate:  [72-91] 72   Respirations:     SPO2:       Physical Examination:   General appearance - alert, well appearing, and in no distress  Mental status - alert, oriented to person, place, and time  Abdomen - soft, nontender, nondistended,   Pelvic - normal external genitalia, vulva, vagina, cervix, and uterus   Back exam - full range of motion, no tenderness or pain on motion  Neurological - alert, oriented, normal speech  Extremities - peripheral pulses normal  Skin - normal coloration and turgor, no suspicious skin lesions noted    Presentation: Vtx;  50/0-1/-2 per Dr. Wylie   Cervix:     Dilation:     Effacement:     Station:         Fetal Heart Rate Assessment :  130s, R, GLTV, cat 1  Method:     Beats/min:     Baseline:     Variability:     Accels:     Decels:     Tracing Category:       Uterine Assessment :  irreg  Method:     Frequency (min):     Ctx Count in 10 min:     Duration:     Intensity:     [place] Units:       Lab Results (last 24 hours)       Procedure Component Value  Units Date/Time    POC Urinalysis Dipstick [[id number]] Collected: 06/18/24 1314    Specimen: Urine Updated: 06/18/24 1316     Glucose, UA Negative mg/dL      Protein, POC Negative mg/dL     Protein / Creatinine Ratio, Urine - Urine, Clean Catch [[id number]] Collected: 06/18/24 1407    Specimen: Urine, Clean Catch Updated: 06/18/24 1532     Creatinine, Urine 59.1 mg/dL      Total Protein, Urine 12.8 mg/dL      Protein/Creatinine Ratio, Urine 0.22    CBC (No Diff) [[id number]]  (Abnormal) Collected: 06/18/24 1407    Specimen: Blood Updated: 06/18/24 1425     WBC 8.59 10*3/mm3      RBC 4.39 10*6/mm3      Hemoglobin 12.2 g/dL      Hematocrit 36.9 %      MCV 84.1 fL      MCH 27.8 pg      MCHC 33.1 g/dL      RDW 15.6 %      RDW-SD 48.0 fl      MPV 11.5 fL      Platelets 263 10*3/mm3     Comprehensive Metabolic Panel [[id number]]  (Abnormal) Collected: 06/18/24 1407    Specimen: Blood Updated: 06/18/24 1451     Glucose 80 mg/dL      BUN 5 mg/dL      Creatinine 0.56 mg/dL      Sodium 134 mmol/L      Potassium 4.0 mmol/L      Chloride 103 mmol/L      CO2 18.9 mmol/L      Calcium 8.6 mg/dL      Total Protein 6.4 g/dL      Albumin 3.3 g/dL      ALT (SGPT) 15 U/L      AST (SGOT) 19 U/L      Alkaline Phosphatase 182 U/L      Total Bilirubin 0.2 mg/dL      Globulin 3.1 gm/dL      A/G Ratio 1.1 g/dL      BUN/Creatinine Ratio 8.9     Anion Gap 12.1 mmol/L      eGFR 131.7 mL/min/1.73     Narrative:      GFR Normal >60  Chronic Kidney Disease <60  Kidney Failure <15               GBS is positive     Assessment & Plan    GHTN    Assessment:  1.  Intrauterine pregnancy at 39w3d gestation with reactive fetal status.    2.  induction of labor  for GHTN  with unfavorable cervix  3.  Obstetrical history significant for is non-contributory.  4.  GBS status:   Group B Strep Culture   Date Value Ref Range Status   05/30/2024 Streptococcus agalactiae (Group B) (A)  Final     Comment:       This organism is considered to be universally susceptible  to penicillin.  No further antibiotic testing will be performed. If Clindamycin or Erythromycin is the drug of choice, notify the laboratory within 7 days to request susceptibility testing.       Plan:  1. cervical ripening with  Misoprostol and antibiotic for GBS  2.  Plan of care has been reviewed with patient and patient agrees.   3.  Risks, benefits of treatment plan have been discussed.  4.  All questions have been answered.        Electronically signed by Faye Blanco MD, 06/18/24, 4:13 PM EDT.      Electronically signed by Faye Blanco MD at 06/18/24 1615        Lucas Clinton MD   Physician  Obstetrics     L&D Delivery Note     Signed     Date of Service: 06/19/24 0839  Creation Time: 06/19/24 0839     Signed          [facility]  Vaginal Delivery Note     Delivery      Delivery: Vaginal, Spontaneous     YOB: 2024    Time of Birth:  Gestational Age 8:12 AM   39w4d      Anesthesia: Epidural   Delivering clinician: Lucas Clinton MD   Forceps?   No   Vacuum? No    Shoulder dystocia present: No                   Delivery narrative:  I was called to the room as the patient was complete complete and +3.  Viable female infant Apgars 7 and 9 was delivered over intact perineum.  Infant's nose and mouth were bulb suctioned the cord was clamped and cut after 30 seconds.  Infant was placed on mom's chest.  Cord gas and cord blood was then obtained.  Placenta delivered a few minutes later spontaneously.  Inspection revealed right vaginal wall laceration and first-degree perineal laceration which were repaired with 3-0 Vicryl suture in interrupted fashion.     Infant     Findings: female  infant      Infant observations: Weight: 3100 g (6 lb 13.4 oz)   Length: 19  in  Observations/Comments:        Apgars: 7  @ 1 minute /     9  @ 5 minutes            Placenta, Cord, and Fluid     Placenta delivered  Spontaneous  at   6/19/2024  8:15 AM     Cord: 3 vessels  present.   Nuchal Cord?  no    Cord blood obtained: Yes    Cord gases obtained:             Yes    Cord gas results: Venous:          pH, Cord Venous   Date Value Ref Range Status   06/19/2024 7.294 7.260 - 7.400 pH Units Final              Base Excess, Cord Venous   Date Value Ref Range Status   06/19/2024 -5.7 -30.0 - 30.0 mmol/L Final       Comment:       Serial Number: [id number]:  [id number]        Arterial:          pH, Cord Arterial   Date Value Ref Range Status   06/19/2024 7.30 7.18 - 7.34 pH Units Final              Base Exc, Cord Arterial   Date Value Ref Range Status   06/19/2024 -4.4 (L) -2.0 - 2.0 mmol/L Final       Comment:       Serial Number: [id number]:  [id number]         Repair     Episiotomy: None    No   Lacerations: Right vaginal wall and first-degree perineal   Estimated Blood Loss: Est. Blood Loss (mL): 350 mL (Filed from Delivery Summary) (06/19/24 0812)               Complications  none     Disposition  Mother to Mother Baby/Postpartum  in stable condition currently.  Baby to remains with mom  in stable condition currently.        Lucas Clinton MD  06/19/24  08:40 EDT

## 2024-06-20 PROCEDURE — 99231 SBSQ HOSP IP/OBS SF/LOW 25: CPT | Performed by: OBSTETRICS & GYNECOLOGY

## 2024-06-20 RX ADMIN — DOCUSATE SODIUM 100 MG: 100 CAPSULE, LIQUID FILLED ORAL at 08:49

## 2024-06-20 RX ADMIN — VITAMIN A, VITAMIN C, VITAMIN D, VITAMIN E, THIAMINE, RIBOFLAVIN, NIACIN, VITAMIN B6, FOLIC ACID, VITAMIN B12, CALCIUM, IRON, ZINC, COPPER 1 TABLET: 4000; 120; 400; 22; 1.84; 3; 20; 10; 1; 12; 200; 27; 25; 2 TABLET ORAL at 08:49

## 2024-06-20 RX ADMIN — ACETAMINOPHEN 650 MG: 325 TABLET ORAL at 14:08

## 2024-06-20 RX ADMIN — DOCUSATE SODIUM 100 MG: 100 CAPSULE, LIQUID FILLED ORAL at 20:07

## 2024-06-20 RX ADMIN — IBUPROFEN 600 MG: 600 TABLET, FILM COATED ORAL at 17:38

## 2024-06-20 RX ADMIN — IBUPROFEN 600 MG: 600 TABLET, FILM COATED ORAL at 08:55

## 2024-06-20 RX ADMIN — IBUPROFEN 600 MG: 600 TABLET, FILM COATED ORAL at 23:25

## 2024-06-20 NOTE — ANESTHESIA POSTPROCEDURE EVALUATION
Patient: Macey León    Procedure Summary       Date: 06/19/24 Room / Location:     Anesthesia Start: 0628 Anesthesia Stop: 0812    Procedure: LABOR ANALGESIA Diagnosis:     Scheduled Providers:  Provider: Abrahan Pierson CRNA    Anesthesia Type: epidural ASA Status: 2            Anesthesia Type: No value filed.    Vitals  Vitals Value Taken Time   /78 06/20/24 0915   Temp 36.8 °C (98.2 °F) 06/20/24 0857   Pulse 97 06/20/24 0857   Resp 16 06/20/24 0857   SpO2 100 % 06/19/24 0745           Post Anesthesia Care and Evaluation    Patient location during evaluation: bedside  Patient participation: complete - patient participated  Level of consciousness: awake  Pain score: 0  Pain management: adequate    Airway patency: patent  Anesthetic complications: No anesthetic complications  PONV Status: controlled  Cardiovascular status: acceptable and stable  Respiratory status: acceptable  Hydration status: acceptable  Post Neuraxial Block status: Motor and sensory function returned to baseline and No signs or symptoms of PDPH

## 2024-06-20 NOTE — PROGRESS NOTES
" [facility]  Vaginal Delivery Progress Note    Subjective   Postpartum Day 1: Vaginal Delivery    The patient feels well.  Her pain is well controlled with nonsteroidal anti-inflammatory drugs and Tylenol.   She is ambulating well.  Patient describes her bleeding as moderate lochia.      Objective     Vital Signs Range for the last 24 hours  Temperature: Temp:  [98.1 °F (36.7 °C)-98.5 °F (36.9 °C)] 98.5 °F (36.9 °C)   Temp Source: Temp src: Oral   BP: BP: ()/() 119/72   Pulse: Heart Rate:  [] 81   Respirations: Resp:  [16-20] 18   SPO2: SpO2:  [96 %-100 %] 100 %   O2 Amount (l/min):     O2 Devices     Weight:       Admit Height:  Height: 157.5 cm (62\")      Physical Exam:  General:  no acute distress.  Abdomen: abdomen is soft without significant tenderness, masses, organomegaly or guarding. Fundus: appropriate, firm, non tender  Extremities: normal, atraumatic, no cyanosis, and trace edema.       Lab results reviewed:  Yes   Rubella:  No results found for: \"RUBELLAIGGIN\" Nurse Transcribed from prenatal record --    Rubella Antibodies, IgG   Date Value Ref Range Status   12/04/2023 3.72 Immune >0.99 index Final     Comment:                                     Non-immune       <0.90                                  Equivocal  0.90 - 0.99                                  Immune           >0.99       Rh Status:    RH type   Date Value Ref Range Status   06/18/2024 Positive  Final     Rh Factor   Date Value Ref Range Status   12/04/2023 Positive  Final     Comment:     Please note: Prior records for this patient's ABO / Rh type are not  available for additional verification.       Immunizations:   Immunization History   Administered Date(s) Administered    COVID-19 (MODERNA) 1st,2nd,3rd Dose Monovalent 08/15/2021, 09/12/2021    DTaP 01/03/2001, 03/17/2001, 05/24/2001, 03/26/2002, 02/02/2005    Hep A, 2 Dose 12/21/2017, 09/12/2018    Hep B, Adolescent or Pediatric 01/03/2001, 03/01/2001, 05/24/2001, " 05/26/2002    Hib (PRP-T) 01/03/2001, 03/01/2001, 05/24/2001, 05/26/2002    IPV 01/03/2001, 03/17/2001, 10/26/2001, 02/02/2005    MMR 03/26/2002, 02/02/2005    Meningococcal MCV4P (Menactra) 03/16/2012, 12/21/2017    Tdap 11/18/2008    Varicella 10/26/2001, 03/16/2012       Assessment & Plan       Gestational htn w/o significant proteinuria, third trimester    Normal labor      Macey Kenyon León is Day 1  post-partum  Vaginal, Spontaneous   .      Plan:  Continue current care.      Lucas Clinton MD  6/20/2024  06:39 EDT

## 2024-06-20 NOTE — PLAN OF CARE
Goal Outcome Evaluation:              Outcome Evaluation: PAIN WELL CONTROLLED THIS SHIFT. PATIENT UP ADLIB, AMBULATING FREQUENTLY.

## 2024-06-20 NOTE — LACTATION NOTE
LC in to follow up with breastfeeding plan with this patient. She states she plans on just pumping and bottle feeding for now. She has brought her wearable pumps and KESHIA instructed her on use and general pumping guidelines. LC sized her to a 21-24 mm flange. She had a pumping a session and 0.5 ml collected. LC discussed that this is a normal amount to express at this point in lactation. She states she had no pain with pumping. LC discussed storage of the breastmilk and cleaning of pumping pieces. Patient showed good motivation and understanding.

## 2024-06-20 NOTE — PLAN OF CARE
Goal Outcome Evaluation:  Plan of Care Reviewed With: patient        Progress: improving  Outcome Evaluation: Pt BP's stable this shift. Pain managed with PRN medications. Reinforced education on safe sleeping with infant, upon RN finding pt sleeping while holding infant. Pt up ad rowena, voiding spontaenously.

## 2024-06-20 NOTE — ANESTHESIA POSTPROCEDURE EVALUATION
Patient: Macey León    Procedure Summary       Date: 06/19/24 Room / Location:     Anesthesia Start: 0628 Anesthesia Stop: 0812    Procedure: LABOR ANALGESIA Diagnosis:     Scheduled Providers:  Provider: Abrahan Pierson CRNA    Anesthesia Type: epidural ASA Status: 2            Anesthesia Type: epidural    Vitals  Vitals Value Taken Time   /78 06/20/24 0915   Temp 36.8 °C (98.2 °F) 06/20/24 0857   Pulse 97 06/20/24 0857   Resp 16 06/20/24 0857   SpO2 100 % 06/19/24 0745           Post Anesthesia Care and Evaluation    Patient location during evaluation: bedside  Patient participation: complete - patient participated  Level of consciousness: awake  Pain score: 0  Pain management: adequate    Airway patency: patent  Anesthetic complications: No anesthetic complications  PONV Status: controlled  Cardiovascular status: acceptable and stable  Respiratory status: acceptable  Hydration status: acceptable  Post Neuraxial Block status: Motor and sensory function returned to baseline and No signs or symptoms of PDPH

## 2024-06-21 VITALS
BODY MASS INDEX: 46.64 KG/M2 | HEIGHT: 62 IN | OXYGEN SATURATION: 100 % | RESPIRATION RATE: 17 BRPM | DIASTOLIC BLOOD PRESSURE: 89 MMHG | TEMPERATURE: 99.1 F | SYSTOLIC BLOOD PRESSURE: 147 MMHG | HEART RATE: 88 BPM

## 2024-06-21 PROBLEM — Z37.9 NORMAL LABOR: Status: RESOLVED | Noted: 2024-06-18 | Resolved: 2024-06-21

## 2024-06-21 LAB
CYTO UR: NORMAL
LAB AP CASE REPORT: NORMAL
LAB AP CLINICAL INFORMATION: NORMAL
PATH REPORT.FINAL DX SPEC: NORMAL
PATH REPORT.GROSS SPEC: NORMAL

## 2024-06-21 PROCEDURE — 99238 HOSP IP/OBS DSCHRG MGMT 30/<: CPT | Performed by: OBSTETRICS & GYNECOLOGY

## 2024-06-21 RX ORDER — ACETAMINOPHEN 325 MG/1
650 TABLET ORAL EVERY 6 HOURS PRN
Qty: 30 TABLET | Refills: 0 | Status: SHIPPED | OUTPATIENT
Start: 2024-06-21 | End: 2024-07-01

## 2024-06-21 RX ORDER — IBUPROFEN 600 MG/1
600 TABLET ORAL EVERY 6 HOURS PRN
Qty: 30 TABLET | Refills: 0 | Status: SHIPPED | OUTPATIENT
Start: 2024-06-21

## 2024-06-21 RX ADMIN — DOCUSATE SODIUM 100 MG: 100 CAPSULE, LIQUID FILLED ORAL at 08:41

## 2024-06-21 RX ADMIN — BENZOCAINE AND LEVOMENTHOL: 200; 5 SPRAY TOPICAL at 12:03

## 2024-06-21 RX ADMIN — WITCH HAZEL: 500 SOLUTION RECTAL; TOPICAL at 12:03

## 2024-06-21 RX ADMIN — ACETAMINOPHEN 650 MG: 325 TABLET ORAL at 08:40

## 2024-06-21 RX ADMIN — VITAMIN A, VITAMIN C, VITAMIN D, VITAMIN E, THIAMINE, RIBOFLAVIN, NIACIN, VITAMIN B6, FOLIC ACID, VITAMIN B12, CALCIUM, IRON, ZINC, COPPER 1 TABLET: 4000; 120; 400; 22; 1.84; 3; 20; 10; 1; 12; 200; 27; 25; 2 TABLET ORAL at 08:41

## 2024-06-21 RX ADMIN — IBUPROFEN 600 MG: 600 TABLET, FILM COATED ORAL at 05:59

## 2024-06-21 NOTE — LACTATION NOTE
LC in to follow up with lactation progress. Patient continues to pump through the night and is getting small amounts and is increasing. Her breasts are starting to feel de today. LC stressed the need to pump with each baby feeding or more often to increase and maintain your supply. Patient is planning on discharge today. LC discussed normal infant output patterns to expect and  if infant is not waking by 3 hours to wake and feed using measures shown in the hospital. LC discussed checking to make sure new medications are safe to breastfeed. LC discussed alcohol use and cigarette/second hand smoke around baby and breastfeeding and discussed the impact of street drugs on infants and breastfeeding. LC used the page in the breastfeeding guide to discuss harmful effects of these. Breastfeeding/Lactation expectations and anticipatory guidance discussed for the next two weeks . LC discussed nipple care, plugged ducts, engorgement, and breast infection. LC encouraged mom to see pediatrician two days from discharge for follow up. Patient has a breastpump for home use and LC discussed good pumping guidelines and normal expectations with pumping and storage and preparation of ebm for feedings. LC discussed breastfeeding/lactation resources including the local breastfeeding support group after discharge and when to call the doctor. Patient showed good understanding.

## 2024-06-21 NOTE — DISCHARGE SUMMARY
[facility]  Delivery Discharge Summary    Primary OB Clinician: Dr. Wylie    EDC: Estimated Date of Delivery: 24    Admitting Diagnosis:  Normal labor [O80, Z37.9]  Gestational hypertension  Discharge Diagnosis:    Same as Admitting plus:   Pregnancy at 39w4d - Delivered     Antepartum complications: Gestational hypertension    Date of Delivery: 2024   Time of Delivery: 8:12 AM     [facility]     Delivery Type: Vaginal, Spontaneous      Tubal Ligation: n/a    Baby:female  infant;   Apgar:  7  @ 1 minute /   Apgar:  9  @ 5 minutes   Weight: 3100 g (6 lb 13.4 oz)    Length: 19     Anesthesia: Epidural      Intrapartum complications: None    Laceration: Vaginal wall and first-degree perineal    Episiotomy: No    Placenta: Spontaneous     Feeding method: Breastfeeding Status: No  Hospital course and discharge exam:  Patient is a 23-year-old  1 para 0 female at 39 weeks estimate gestational age who was admitted with diagnosis of gestational hypertension for induction of labor.  She progressed to complete dilation and had an uncomplicated vaginal delivery productive viable female infant weighing 6 pounds 13.4 ounces with Apgars of 7 and 9.  Postpartum course been uncomplicated.  Blood pressures have ranged from normal range to mild hypertension.  No  antihypertensives were started.  She reports minimal vaginal bleeding.  She is tolerating p.o. intake without difficulty.    Discharge exam: Blood pressure 130s to 150s/70s to 90s  Patient is alert and oriented no acute distress  Lungs are clear to auscultation bilaterally  Heart is regular rhythm  Abdomen soft nontender and the uterus firm at U -3 station    Patient will follow-up with Dr. Wylie and 5 to 6 weeks for postpartum exam    Discharge Date: 2024; Discharge Time: 07:21 EDT        Plan:      Follow-up appointment with Dr. Wylie.

## 2024-07-01 ENCOUNTER — MATERNAL SCREENING (OUTPATIENT)
Dept: CALL CENTER | Facility: HOSPITAL | Age: 24
End: 2024-07-01
Payer: MEDICAID

## 2024-07-01 NOTE — OUTREACH NOTE
Maternal Screening Survey      Flowsheet Row Responses   Facility patient discharged from? [facility]   Attempt successful? Yes   Call start time 1235   Call end time 1238   EPD Scale: Able to Laugh 0-->as much as she always could   EPD Scale: Looked Forward 0-->as much as she ever did   EPD Scale: Blamed Self 0-->no, never   EPD Scale: Been Anxious 0-->no, not at all   EPD Scale: Felt Panicky 0-->no, not at all   EPD Scale: Things Getting on Top 0-->no, has been coping as well as ever   EPD Scale: Difficulty Sleeping 0-->no, not at all   EPD Scale: Sad or Miserable 0-->no, not at all   EPD Scale: Crying 0-->no, never   EPD Scale: Thought of Harming Self 0-->never   [place]  Depression Score 0   Did any of your parents have problems with alcohol or drug use? No   Do any of your peers have problems with alcohol or drug use? No   Does your partner have problems with alcohol or drug use? No   Before you were pregnant did you have problems with alcohol or drug use? (past) No   In the past month, did you drink beer, wine, liquor or use any other drugs? (pregnancy) No   Maternal Screening call completed Yes   Call end time 1238              Dulce BANUELOS - Registered Nurse

## 2024-07-01 NOTE — OUTREACH NOTE
Maternal Screening Survey      Flowsheet Row Responses   Facility patient discharged from? [facility]   Attempt successful? No   Unsuccessful attempts Attempt 1              Dulce BANUELOS - Registered Nurse

## 2024-07-31 NOTE — PROGRESS NOTES
POSTPARTUM Follow Up Visit      Chief Complaint   Patient presents with    Postpartum Care     HPI:      Date of delivery: 2024  Delivery type:            Perinuem:  right vaginal wall laceration and first-degree perineal laceration   Delivering Provider:   Dr. Lucas Clinton MD       Feeding: Bottle  Pain:  No  Vaginal Bleeding:  No  Depressed/Anxious:  No  EPDS score: 1   #10: 0  Plans for BC:  Birth control pills  Weight at last OB OV:  255lbs  Last pap date and result:   Last Completed Pap Smear            PAP SMEAR (Every 3 Years) Next due on 2023  Patient-Reported (Performed Externally)    2023  IGP,CtNgTv,rfx Aptima HPV ASCU                      EPD Scale: Thought of Harming Self: 0-->never  [place]  Depression Score: 1        PHYSICAL EXAM:  /78   Wt 108 kg (239 lb)   LMP 2023   Breastfeeding No   BMI 43.71 kg/m²  24 kg (53 lb)  General- NAD, alert and oriented, appropriate  Psych- Normal mood, good memory, good eye contact      Abdomen- Soft, non distended, non tender, no masses  Ext- No edema    ASSESSMENT AND PLAN:  Diagnoses and all orders for this visit:    1. Postpartum follow-up (Primary)    2. Encounter for initial prescription of contraceptive pills  -     norethindrone-ethinyl estradiol FE (Junel FE 1/20) 1-20 MG-MCG per tablet; Take 1 tablet by mouth Daily.  Dispense: 28 tablet; Refill: 12      Counseling:    All birth control options reviewed in detail.  R/B/A/SE/E of each wrt pts PMHx and prior BC use  May resume intercourse once 4 weeks postpartum  Ok to return to work/school once patient desires/maternity leave completed  Use backup contraception for 4 weeks after initiating chosen BC        Follow Up:  Return in about 1 year (around 2025), or if symptoms worsen or fail to improve, for Annual physical.          Devi Wylie DO  2024    [facility]  [address]  [place] [address]  Dept: [phone]  Dept Fax: [phone]  Loc: [phone]  Loc Fax: [phone]

## 2024-08-06 ENCOUNTER — POSTPARTUM VISIT (OUTPATIENT)
Dept: OBSTETRICS AND GYNECOLOGY | Facility: CLINIC | Age: 24
End: 2024-08-06
Payer: MEDICAID

## 2024-08-06 VITALS — SYSTOLIC BLOOD PRESSURE: 124 MMHG | BODY MASS INDEX: 43.71 KG/M2 | DIASTOLIC BLOOD PRESSURE: 78 MMHG | WEIGHT: 239 LBS

## 2024-08-06 DIAGNOSIS — Z30.011 ENCOUNTER FOR INITIAL PRESCRIPTION OF CONTRACEPTIVE PILLS: ICD-10-CM

## 2024-08-06 PROCEDURE — 99213 OFFICE O/P EST LOW 20 MIN: CPT | Performed by: STUDENT IN AN ORGANIZED HEALTH CARE EDUCATION/TRAINING PROGRAM

## 2024-08-06 RX ORDER — NORETHINDRONE ACETATE AND ETHINYL ESTRADIOL 1MG-20(21)
1 KIT ORAL DAILY
Qty: 28 TABLET | Refills: 12 | Status: SHIPPED | OUTPATIENT
Start: 2024-08-06 | End: 2025-08-06

## 2024-09-18 PROCEDURE — 87591 N.GONORRHOEAE DNA AMP PROB: CPT | Performed by: FAMILY MEDICINE

## 2024-09-18 PROCEDURE — 87491 CHLMYD TRACH DNA AMP PROBE: CPT | Performed by: FAMILY MEDICINE

## 2024-09-18 PROCEDURE — 87661 TRICHOMONAS VAGINALIS AMPLIF: CPT | Performed by: FAMILY MEDICINE

## 2025-08-07 ENCOUNTER — OFFICE VISIT (OUTPATIENT)
Dept: OBSTETRICS AND GYNECOLOGY | Age: 25
End: 2025-08-07
Payer: COMMERCIAL

## 2025-08-07 VITALS
HEIGHT: 62 IN | SYSTOLIC BLOOD PRESSURE: 122 MMHG | BODY MASS INDEX: 44.16 KG/M2 | WEIGHT: 240 LBS | DIASTOLIC BLOOD PRESSURE: 87 MMHG | HEART RATE: 100 BPM

## 2025-08-07 DIAGNOSIS — Z11.3 SCREEN FOR STD (SEXUALLY TRANSMITTED DISEASE): ICD-10-CM

## 2025-08-07 DIAGNOSIS — Z01.419 WELL WOMAN EXAM WITH ROUTINE GYNECOLOGICAL EXAM: Primary | ICD-10-CM

## 2025-08-07 LAB
C TRACH DNA SPEC QL NAA+PROBE: NOT DETECTED
N GONORRHOEA RRNA SPEC QL NAA+PROBE: NOT DETECTED

## 2025-08-07 PROCEDURE — 87591 N.GONORRHOEAE DNA AMP PROB: CPT | Performed by: STUDENT IN AN ORGANIZED HEALTH CARE EDUCATION/TRAINING PROGRAM

## 2025-08-07 PROCEDURE — 87491 CHLMYD TRACH DNA AMP PROBE: CPT | Performed by: STUDENT IN AN ORGANIZED HEALTH CARE EDUCATION/TRAINING PROGRAM

## 2025-08-07 RX ORDER — FEXOFENADINE HCL 60 MG/1
60 TABLET, FILM COATED ORAL DAILY
COMMUNITY

## 2025-08-07 RX ORDER — CETIRIZINE HYDROCHLORIDE 5 MG/1
5 TABLET ORAL DAILY
COMMUNITY

## 2025-08-08 ENCOUNTER — RESULTS FOLLOW-UP (OUTPATIENT)
Dept: OBSTETRICS AND GYNECOLOGY | Age: 25
End: 2025-08-08
Payer: COMMERCIAL